# Patient Record
Sex: MALE | Race: WHITE | Employment: OTHER | URBAN - NONMETROPOLITAN AREA
[De-identification: names, ages, dates, MRNs, and addresses within clinical notes are randomized per-mention and may not be internally consistent; named-entity substitution may affect disease eponyms.]

---

## 2018-05-25 ENCOUNTER — TELEPHONE (OUTPATIENT)
Dept: NEUROSURGERY | Age: 38
End: 2018-05-25

## 2018-05-29 ENCOUNTER — TELEPHONE (OUTPATIENT)
Dept: NEUROSURGERY | Age: 38
End: 2018-05-29

## 2018-06-07 ENCOUNTER — OFFICE VISIT (OUTPATIENT)
Dept: NEUROSURGERY | Age: 38
End: 2018-06-07
Payer: COMMERCIAL

## 2018-06-07 VITALS
OXYGEN SATURATION: 98 % | DIASTOLIC BLOOD PRESSURE: 96 MMHG | BODY MASS INDEX: 24 KG/M2 | SYSTOLIC BLOOD PRESSURE: 130 MMHG | HEIGHT: 74 IN | WEIGHT: 187 LBS | HEART RATE: 79 BPM

## 2018-06-07 DIAGNOSIS — G89.29 CHRONIC MIDLINE LOW BACK PAIN WITH BILATERAL SCIATICA: Primary | ICD-10-CM

## 2018-06-07 DIAGNOSIS — R20.0 NUMBNESS OF LEFT FOOT: ICD-10-CM

## 2018-06-07 DIAGNOSIS — M51.36 DDD (DEGENERATIVE DISC DISEASE), LUMBAR: ICD-10-CM

## 2018-06-07 DIAGNOSIS — M51.26 LUMBAR DISC HERNIATION: ICD-10-CM

## 2018-06-07 DIAGNOSIS — M54.41 CHRONIC MIDLINE LOW BACK PAIN WITH BILATERAL SCIATICA: Primary | ICD-10-CM

## 2018-06-07 DIAGNOSIS — M54.42 CHRONIC MIDLINE LOW BACK PAIN WITH BILATERAL SCIATICA: Primary | ICD-10-CM

## 2018-06-07 PROCEDURE — 99204 OFFICE O/P NEW MOD 45 MIN: CPT | Performed by: NURSE PRACTITIONER

## 2018-06-07 RX ORDER — GABAPENTIN 300 MG/1
CAPSULE ORAL
COMMUNITY
Start: 2018-06-05 | End: 2018-09-26 | Stop reason: ALTCHOICE

## 2018-06-07 RX ORDER — IBUPROFEN 800 MG/1
800 TABLET ORAL EVERY 8 HOURS PRN
COMMUNITY
Start: 2018-06-05

## 2018-06-07 RX ORDER — CYCLOBENZAPRINE HCL 10 MG
10 TABLET ORAL 3 TIMES DAILY PRN
COMMUNITY
Start: 2018-06-05 | End: 2018-09-26 | Stop reason: ALTCHOICE

## 2018-06-07 ASSESSMENT — ENCOUNTER SYMPTOMS
HEARTBURN: 0
BLOOD IN STOOL: 0
DIARRHEA: 0
EYES NEGATIVE: 1
BACK PAIN: 1
CONSTIPATION: 1
RESPIRATORY NEGATIVE: 1
ABDOMINAL PAIN: 1
ORTHOPNEA: 0
NAUSEA: 1
VOMITING: 1

## 2018-09-26 ENCOUNTER — HOSPITAL ENCOUNTER (OUTPATIENT)
Dept: PAIN MANAGEMENT | Age: 38
Discharge: HOME | End: 2018-09-26
Payer: COMMERCIAL

## 2018-09-26 VITALS
TEMPERATURE: 97.8 F | DIASTOLIC BLOOD PRESSURE: 111 MMHG | SYSTOLIC BLOOD PRESSURE: 160 MMHG | BODY MASS INDEX: 25.54 KG/M2 | HEART RATE: 90 BPM | OXYGEN SATURATION: 100 % | RESPIRATION RATE: 18 BRPM | WEIGHT: 199 LBS | HEIGHT: 74 IN

## 2018-09-26 DIAGNOSIS — G89.29 CHRONIC BILATERAL LOW BACK PAIN, WITH SCIATICA PRESENCE UNSPECIFIED: Primary | ICD-10-CM

## 2018-09-26 DIAGNOSIS — M53.3 SACROILIAC JOINT DYSFUNCTION OF BOTH SIDES: ICD-10-CM

## 2018-09-26 DIAGNOSIS — M79.18 MYOFASCIAL PAIN: ICD-10-CM

## 2018-09-26 DIAGNOSIS — M62.830 MUSCLE SPASM OF BACK: Primary | ICD-10-CM

## 2018-09-26 DIAGNOSIS — M54.5 CHRONIC BILATERAL LOW BACK PAIN, WITH SCIATICA PRESENCE UNSPECIFIED: Primary | ICD-10-CM

## 2018-09-26 PROCEDURE — 99204 OFFICE O/P NEW MOD 45 MIN: CPT

## 2018-09-26 PROCEDURE — 99204 OFFICE O/P NEW MOD 45 MIN: CPT | Performed by: NURSE PRACTITIONER

## 2018-09-26 RX ORDER — EMOLLIENT BASE
CREAM (GRAM) TOPICAL
Qty: 300 G | Refills: 5 | Status: SHIPPED | OUTPATIENT
Start: 2018-09-26

## 2018-09-26 RX ORDER — TIZANIDINE 4 MG/1
TABLET ORAL
Qty: 60 TABLET | Refills: 0 | Status: SHIPPED | OUTPATIENT
Start: 2018-09-26

## 2018-09-26 ASSESSMENT — PAIN DESCRIPTION - PAIN TYPE: TYPE: CHRONIC PAIN

## 2018-09-26 ASSESSMENT — PAIN DESCRIPTION - LOCATION: LOCATION: BACK

## 2018-09-26 ASSESSMENT — PAIN DESCRIPTION - FREQUENCY: FREQUENCY: CONTINUOUS

## 2018-09-26 ASSESSMENT — PAIN DESCRIPTION - PROGRESSION: CLINICAL_PROGRESSION: GRADUALLY WORSENING

## 2018-09-26 ASSESSMENT — PAIN SCALES - GENERAL: PAINLEVEL_OUTOF10: 10

## 2018-09-26 ASSESSMENT — PAIN DESCRIPTION - ORIENTATION: ORIENTATION: LOWER

## 2018-09-26 ASSESSMENT — ACTIVITIES OF DAILY LIVING (ADL): EFFECT OF PAIN ON DAILY ACTIVITIES: LIMITS DAILY ACTIVITIES

## 2018-09-26 ASSESSMENT — PAIN DESCRIPTION - ONSET: ONSET: ON-GOING
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
## 2018-10-24 ENCOUNTER — TELEPHONE (OUTPATIENT)
Dept: NEUROSURGERY | Age: 38
End: 2018-10-24

## 2018-10-24 ENCOUNTER — TELEPHONE (OUTPATIENT)
Dept: PAIN MANAGEMENT | Age: 38
End: 2018-10-24

## 2018-10-24 NOTE — TELEPHONE ENCOUNTER
Patient called to report that he went to the ER at [facility] yesterday 10/23/2018 for low back pain. Patient stated that he was given a steroid shot, a shot of Diluadid, a medrol dose pack, and an Rx for Norco 7.5mg/325mg #15. Patient stated that he had already filled Norco Rx. Patient also stated that he was prescribed Lyrica per Dr. Vinny Garcia office. Patient is scheduled for injections on 10/25/2018, patient wanted to know if he could still have injections. Discussed all of the above with Sherry Aquino, per [facility], patient informed not to fill any other narcotic Rx's without approval first or he would be dismissed. Patient also informed that his injection appointment would have to be rescheduled due to him currently taking steroids. Appointment rescheduled in 1 month per Germán Son APRN. Patient also stated that he was given Lyrica from Dr. Klaus Holbrook for Low back pain as well. Patient has been seeing multiple providers for low back pain, patient informed that per Germán Son APRN, this can make treatment difficult and patient only needs to see 1 provider for current complaint of chronic low back pain. Patient verbalized understanding of all instructions given.

## 2018-11-20 ENCOUNTER — HOSPITAL ENCOUNTER (OUTPATIENT)
Dept: PAIN MANAGEMENT | Age: 38
Discharge: HOME | End: 2018-11-20
Payer: COMMERCIAL

## 2018-11-20 VITALS
WEIGHT: 201.38 LBS | HEIGHT: 74 IN | OXYGEN SATURATION: 100 % | TEMPERATURE: 97.4 F | DIASTOLIC BLOOD PRESSURE: 109 MMHG | HEART RATE: 109 BPM | RESPIRATION RATE: 20 BRPM | BODY MASS INDEX: 25.84 KG/M2 | SYSTOLIC BLOOD PRESSURE: 156 MMHG

## 2018-11-20 LAB — SUMMARY COMPLIANCE DRUG ANAL, UR: NORMAL ML

## 2018-11-20 PROCEDURE — 80307 DRUG TEST PRSMV CHEM ANLYZR: CPT

## 2018-11-20 ASSESSMENT — PAIN DESCRIPTION - DESCRIPTORS: DESCRIPTORS: CONSTANT;SHARP

## 2018-11-20 ASSESSMENT — PAIN DESCRIPTION - DIRECTION: RADIATING_TOWARDS: DOWN BILATERAL LEGS

## 2018-11-20 ASSESSMENT — ACTIVITIES OF DAILY LIVING (ADL): EFFECT OF PAIN ON DAILY ACTIVITIES: LIMITS ACTIVIITES

## 2018-11-20 ASSESSMENT — PAIN DESCRIPTION - LOCATION: LOCATION: BACK

## 2018-11-20 ASSESSMENT — PAIN SCALES - GENERAL: PAINLEVEL_OUTOF10: 8

## 2018-11-20 ASSESSMENT — PAIN DESCRIPTION - ORIENTATION: ORIENTATION: LOWER

## 2018-11-20 ASSESSMENT — PAIN DESCRIPTION - ONSET: ONSET: ON-GOING

## 2018-11-20 ASSESSMENT — PAIN DESCRIPTION - PAIN TYPE: TYPE: CHRONIC PAIN

## 2018-11-20 ASSESSMENT — PAIN DESCRIPTION - FREQUENCY: FREQUENCY: CONTINUOUS

## 2018-11-20 ASSESSMENT — PAIN DESCRIPTION - PROGRESSION: CLINICAL_PROGRESSION: NOT CHANGED

## 2018-11-27 ENCOUNTER — TELEPHONE (OUTPATIENT)
Dept: PAIN MANAGEMENT | Age: 38
End: 2018-11-27

## 2018-11-27 NOTE — TELEPHONE ENCOUNTER
Per Timothy Back APRN, pt to be discharged due to recent UDS results. . Attempted to notify pt of this. No answer. Message left for pt to call office. [facility] office notified to send pt a discharge letter.

## 2018-11-28 ENCOUNTER — TELEPHONE (OUTPATIENT)
Dept: PAIN MANAGEMENT | Age: 38
End: 2018-11-28

## 2019-01-22 NOTE — PROGRESS NOTES
"Primary Care Provider: Michelle Lockhart  Requesting Provider: Patria Alcocer*    Chief Complaint:   Chief Complaint   Patient presents with   • Neck Pain   • Back Pain   • Leg Pain     BLE     History of Present Illness  Ezra King is a 38 y.o. male is being seen for consultation today at the request of Patria Kulkarni*    He presents today with his wife for a complaint of neck and lower back pain that has progressively worsened over the last 1 year.    Neck discomfort is intermittent, approximately 3 times daily.  He describes his discomfort as \"cramps and a grinding pain\".  Neck discomfort radiates to the posterior right eye, causing eye twitching.  He additionally reports numbness and tingling to the tips of the first, second, and fifth digits bilaterally, bilateral upper extremity weakness, and a burning dysesthesia to the right upper neck that is non radiating.  Fine motor skills have progressively worsened.  His neck discomfort increases with driving, and lessens with use of Lyrica, Motrin, Flexeril, Norco, and injections which he receives from pain management.  Mr. King participated in the initial eval for physical therapy but did not return for additional sessions.    He describes his lumbar back pain is a constant throb.  He reports numbness and tingling that radiates down the posterior aspect of his bilateral lower extremities and extends into the bilateral feet.  He denies burning dysesthesias.  His discomfort increases with prolonged standing and sitting.  Despite use of his pain medications he states his back discomfort is never relieved and he is not able to participate in any physical activity due to his discomfort.  He denies bowel dysfunction.  He reports one episode of enuresis after starting gabapentin, medication since discontinued.  No additional episodes of enuresis.  He denies fevers, chills, night sweats, unexplained weight loss, or saddle anesthesia.  He currently rates the " "severity of his neck and back pain 8/10.    Review of Systems   Constitutional: Positive for activity change, appetite change, chills, fatigue, fever and unexpected weight change.   HENT: Positive for congestion, drooling, sinus pressure, sneezing and trouble swallowing.    Eyes: Positive for pain, redness and itching.   Respiratory: Positive for cough, chest tightness, shortness of breath and wheezing.    Cardiovascular: Positive for chest pain and leg swelling.   Gastrointestinal: Positive for abdominal pain, constipation, nausea and rectal pain.   Endocrine: Negative.    Genitourinary: Positive for decreased urine volume, difficulty urinating and urgency.   Musculoskeletal: Positive for back pain, neck pain and neck stiffness.   Skin: Negative.    Allergic/Immunologic: Positive for environmental allergies.   Neurological: Positive for dizziness, tremors, weakness, light-headedness, numbness and headaches.   Hematological: Bruises/bleeds easily.   Psychiatric/Behavioral: Positive for behavioral problems, confusion and sleep disturbance. The patient is nervous/anxious and is hyperactive.    All other systems reviewed and are negative.    No past medical history on file.    No past surgical history on file.    Family History: family history is not on file.    Social History:      Medications:  No current outpatient medications on file.    Allergies:  Patient has no allergy information on record.    Objective    Ht 157.5 cm (62\")   Wt 93.4 kg (206 lb)   BMI 37.68 kg/m²    Physical Exam   Constitutional: He is oriented to person, place, and time. He appears well-developed and well-nourished.  Non-toxic appearance. He does not have a sickly appearance. He does not appear ill. No distress.   Eyes: EOM are normal. Pupils are equal, round, and reactive to light.   Cardiovascular:   Pulses:       Popliteal pulses are 2+ on the right side.        Dorsalis pedis pulses are 2+ on the right side.   Diffuse varicosities noted " to the right lower extremity.  Compression stocking noted.   Neurological: He is oriented to person, place, and time. Gait normal.   Reflex Scores:       Tricep reflexes are 1+ on the right side and 1+ on the left side.       Bicep reflexes are 1+ on the right side and 1+ on the left side.       Brachioradialis reflexes are 1+ on the right side and 1+ on the left side.       Patellar reflexes are 1+ on the right side and 1+ on the left side.       Achilles reflexes are 1+ on the right side and 1+ on the left side.  Psychiatric: His speech is normal.   Nursing note and vitals reviewed.    Neurologic Exam     Mental Status   Oriented to person, place, and time.   Attention: normal. Concentration: normal.   Speech: speech is normal   Level of consciousness: alert    Cranial Nerves     CN II   Visual fields full to confrontation.     CN III, IV, VI   Pupils are equal, round, and reactive to light.  Extraocular motions are normal.     CN V   Facial sensation intact.     CN VII   Facial expression full, symmetric.     CN VIII   CN VIII normal.     CN IX, X   CN IX normal.     CN XI   CN XI normal.     Motor Exam   Muscle bulk: normal  Overall muscle tone: normal  Right arm tone: normal  Left arm tone: normal  Right arm pronator drift: absent  Left arm pronator drift: absent  Right leg tone: normal  Left leg tone: normal    Strength   Right deltoid: 5/5  Left deltoid: 5/5  Right biceps: 5/5  Left biceps: 5/5  Right triceps: 5/5  Left triceps: 5/5  Right wrist extension: 5/5  Left wrist extension: 5/5  Right iliopsoas: 5/5  Left iliopsoas: 5/5  Right quadriceps: 5/5  Left quadriceps: 5/5  Right anterior tibial: 5/5  Left anterior tibial: 5/5  Right posterior tibial: 5/5  Left posterior tibial: 5/5  Give way weakness throughout.     Sensory Exam   Light touch normal.     Gait, Coordination, and Reflexes     Gait  Gait: normal    Tremor   Resting tremor: absent  Intention tremor: absent  Action tremor: absent    Reflexes    Right brachioradialis: 1+  Left brachioradialis: 1+  Right biceps: 1+  Left biceps: 1+  Right triceps: 1+  Left triceps: 1+  Right patellar: 1+  Left patellar: 1+  Right achilles: 1+  Left achilles: 1+  Right : 1+  Left : 2+  Right plantar: normal  Left plantar: normal  Right Fowler: absent  Left Fowler: absent  Right ankle clonus: absent  Left ankle clonus: absent  Right pendular knee jerk: absent  Left pendular knee jerk: absent    Oswestry Disability Index (Magdalena et al, 1980)   Score   Pain Intensity 3   Personal Care 4   Lifting 4   Walking 3   Sitting 3   Standing 3   Sleeping 4   Sex Life (if applicable) 4   Social Life 4   Traveling 3   Previous Treatment Yes   TOTAL = Score x2  (or 2.22 if one NA) 70     SCORE INTERPRETATION OF THE OSWESTRY LBP DISABILITY QUESTIONNAIRE     60-80% Crippled Back pain impinges on all aspects of these patients’ lives both at home and at work. Positive intervention is required.     Imaging: (independent review and interpretation)                     ASSESSMENT and PLAN  Ezra King is a 38 y.o. male with a significant comorbidity retention, arthritis, tobacco abuse, and obesity. He presents with a new problem of neck and back pain. Physical exam findings of diffuse varicosities in the right lower extremity, decreased  strength on the right, in general decreased reflexes.  His imaging shows left foraminal narrowing at C3/4; right disc bulge at L3/ 4, moderate disc space narrowing at L4/5, facet hypertrophy with mild bilateral foraminal narrowing; facet hypertrophy with mild bilateral foraminal narrowing of L5-S1.    I favor continuing conservative management at this time .  I encouraged the patient to reconsider physical therapy as a treatment option.   PT/OT, prescription provided to patient.  Massage and Chiropractic as tolerated.  May continue currently prescribed medications as recommended by pain management.  B/R/AE and use discussed.  In the interim, due to  upper and lower extremity paresthesias I would like to obtain an EMG/nerve conduction study.  If no improvement return after 4 week for PT/OT for reassessment.   I advised the patient to call as needed for complaints of weakness, paresthesias, gait disturbances, or any additional concerns.  Mr. King agrees with this plan of care.    The patient understands the many dangers of continuing to use tobacco. Despite this, Mr. King states quitting is not an immediate priority at this time and declines to discuss tobacco cessation.  I reminded the patient that if quitting becomes an increased priority to contact us for help with quitting.        BMI today is 37.7.  Information on the DASH diet provided in the AVS.  We will continue to provided diet and exercise information with the goal of weight loss at each scheduled appointment.     Ezra was seen today for neck pain, back pain and leg pain.    Diagnoses and all orders for this visit:    Neck pain  -     Ambulatory Referral to Physical Therapy Evaluate and treat (3x a week for 3-4 weeks)    Numbness and tingling of both upper extremities  -     EMG & Nerve Conduction Test; Future    Chronic bilateral low back pain with bilateral sciatica    Numbness and tingling of both lower extremities  -     EMG & Nerve Conduction Test; Future    Obesity (BMI 30-39.9)    Cigarette smoker      Return in about 4 weeks (around 2/20/2019) for Next scheduled follow up with Dr Capone.    Thank you for this Consultation and the opportunity to participate in zEra's care.    Sincerely,  OLIVER Bradley    Level of Risk: Moderate due to: acute illness with sytemic symptoms  MDM: Moderate Complexity  (Mod = 67959, High = 19408)

## 2019-01-23 ENCOUNTER — OFFICE VISIT (OUTPATIENT)
Dept: NEUROSURGERY | Facility: CLINIC | Age: 39
End: 2019-01-23

## 2019-01-23 VITALS — BODY MASS INDEX: 37.91 KG/M2 | HEIGHT: 62 IN | WEIGHT: 206 LBS

## 2019-01-23 DIAGNOSIS — F17.210 CIGARETTE SMOKER: ICD-10-CM

## 2019-01-23 DIAGNOSIS — G89.29 CHRONIC BILATERAL LOW BACK PAIN WITH BILATERAL SCIATICA: ICD-10-CM

## 2019-01-23 DIAGNOSIS — R20.2 NUMBNESS AND TINGLING OF BOTH UPPER EXTREMITIES: ICD-10-CM

## 2019-01-23 DIAGNOSIS — M54.41 CHRONIC BILATERAL LOW BACK PAIN WITH BILATERAL SCIATICA: ICD-10-CM

## 2019-01-23 DIAGNOSIS — R20.2 NUMBNESS AND TINGLING OF BOTH LOWER EXTREMITIES: ICD-10-CM

## 2019-01-23 DIAGNOSIS — E66.9 OBESITY (BMI 30-39.9): ICD-10-CM

## 2019-01-23 DIAGNOSIS — R20.0 NUMBNESS AND TINGLING OF BOTH LOWER EXTREMITIES: ICD-10-CM

## 2019-01-23 DIAGNOSIS — R20.0 NUMBNESS AND TINGLING OF BOTH UPPER EXTREMITIES: ICD-10-CM

## 2019-01-23 DIAGNOSIS — M54.2 NECK PAIN: Primary | ICD-10-CM

## 2019-01-23 DIAGNOSIS — M54.42 CHRONIC BILATERAL LOW BACK PAIN WITH BILATERAL SCIATICA: ICD-10-CM

## 2019-01-23 PROCEDURE — 99204 OFFICE O/P NEW MOD 45 MIN: CPT | Performed by: NURSE PRACTITIONER

## 2019-01-23 RX ORDER — GUAIFENESIN 600 MG/1
TABLET, EXTENDED RELEASE ORAL
Status: ON HOLD | COMMUNITY
End: 2019-11-25

## 2019-01-23 RX ORDER — LORATADINE 10 MG/1
TABLET, ORALLY DISINTEGRATING ORAL
COMMUNITY
End: 2019-04-25

## 2019-01-23 NOTE — PATIENT INSTRUCTIONS
Health Risks of Smoking  Smoking cigarettes is very bad for your health. Tobacco smoke has over 200 known poisons in it. It contains the poisonous gases nitrogen oxide and carbon monoxide. There are over 60 chemicals in tobacco smoke that cause cancer.  Smoking is difficult to quit because a chemical in tobacco, called nicotine, causes addiction or dependence. When you smoke and inhale, nicotine is absorbed rapidly into the bloodstream through your lungs. Both inhaled and non-inhaled nicotine may be addictive.  What are the risks of cigarette smoke?  Cigarette smokers have an increased risk of many serious medical problems, including:  · Lung cancer.  · Lung disease, such as pneumonia, bronchitis, and emphysema.  · Chest pain (angina) and heart attack because the heart is not getting enough oxygen.  · Heart disease and peripheral blood vessel disease.  · High blood pressure (hypertension).  · Stroke.  · Oral cancer, including cancer of the lip, mouth, or voice box.  · Bladder cancer.  · Pancreatic cancer.  · Cervical cancer.  · Pregnancy complications, including premature birth.  · Stillbirths and smaller  babies, birth defects, and genetic damage to sperm.  · Early menopause.  · Lower estrogen level for women.  · Infertility.  · Facial wrinkles.  · Blindness.  · Increased risk of broken bones (fractures).  · Senile dementia.  · Stomach ulcers and internal bleeding.  · Delayed wound healing and increased risk of complications during surgery.  · Even smoking lightly shortens your life expectancy by several years.    Because of secondhand smoke exposure, children of smokers have an increased risk of the following:  · Sudden infant death syndrome (SIDS).  · Respiratory infections.  · Lung cancer.  · Heart disease.  · Ear infections.    What are the benefits of quitting?  There are many health benefits of quitting smoking. Here are some of them:  · Within days of quitting smoking, your risk of having a heart  attack decreases, your blood flow improves, and your lung capacity improves. Blood pressure, pulse rate, and breathing patterns start returning to normal soon after quitting.  · Within months, your lungs may clear up completely.  · Quitting for 10 years reduces your risk of developing lung cancer and heart disease to almost that of a nonsmoker.  · People who quit may see an improvement in their overall quality of life.    How do I quit smoking?  Smoking is an addiction with both physical and psychological effects, and longtime habits can be hard to change. Your health care provider can recommend:  · Programs and community resources, which may include group support, education, or talk therapy.  · Prescription medicines to help reduce cravings.  · Nicotine replacement products, such as patches, gum, and nasal sprays. Use these products only as directed. Do not replace cigarette smoking with electronic cigarettes, which are commonly called e-cigarettes. The safety of e-cigarettes is not known, and some may contain harmful chemicals.  · A combination of two or more of these methods.    Where to find more information:  · American Lung Association: www.lung.org  · American Cancer Society: www.cancer.org  Summary  · Smoking cigarettes is very bad for your health. Cigarette smokers have an increased risk of many serious medical problems, including several cancers, heart disease, and stroke.  · Smoking is an addiction with both physical and psychological effects, and longtime habits can be hard to change.  · By stopping right away, you can greatly reduce the risk of medical problems for you and your family.  · To help you quit smoking, your health care provider can recommend programs, community resources, prescription medicines, and nicotine replacement products such as patches, gum, and nasal sprays.  This information is not intended to replace advice given to you by your health care provider. Make sure you discuss any  "questions you have with your health care provider.  Document Released: 01/25/2006 Document Revised: 12/22/2017 Document Reviewed: 12/22/2017  [employer] Interactive Patient Education © 2017 [employer]  DASH Eating Plan  DASH stands for \"Dietary Approaches to Stop Hypertension.\" The DASH eating plan is a healthy eating plan that has been shown to reduce high blood pressure (hypertension). It may also reduce your risk for type 2 diabetes, heart disease, and stroke. The DASH eating plan may also help with weight loss.  What are tips for following this plan?  General guidelines  · Avoid eating more than 2,300 mg (milligrams) of salt (sodium) a day. If you have hypertension, you may need to reduce your sodium intake to 1,500 mg a day.  · Limit alcohol intake to no more than 1 drink a day for nonpregnant women and 2 drinks a day for men. One drink equals 12 oz of beer, 5 oz of wine, or 1½ oz of hard liquor.  · Work with your health care provider to maintain a healthy body weight or to lose weight. Ask what an ideal weight is for you.  · Get at least 30 minutes of exercise that causes your heart to beat faster (aerobic exercise) most days of the week. Activities may include walking, swimming, or biking.  · Work with your health care provider or diet and nutrition specialist (dietitian) to adjust your eating plan to your individual calorie needs.  Reading food labels  · Check food labels for the amount of sodium per serving. Choose foods with less than 5 percent of the Daily Value of sodium. Generally, foods with less than 300 mg of sodium per serving fit into this eating plan.  · To find whole grains, look for the word \"whole\" as the first word in the ingredient list.  Shopping  · Buy products labeled as \"low-sodium\" or \"no salt added.\"  · Buy fresh foods. Avoid canned foods and premade or frozen meals.  Cooking  · Avoid adding salt when cooking. Use salt-free seasonings or herbs instead of table salt or sea salt. Check " with your health care provider or pharmacist before using salt substitutes.  · Do not aguiar foods. Cook foods using healthy methods such as baking, boiling, grilling, and broiling instead.  · Cook with heart-healthy oils, such as olive, canola, soybean, or sunflower oil.  Meal planning    · Eat a balanced diet that includes:  ? 5 or more servings of fruits and vegetables each day. At each meal, try to fill half of your plate with fruits and vegetables.  ? Up to 6-8 servings of whole grains each day.  ? Less than 6 oz of lean meat, poultry, or fish each day. A 3-oz serving of meat is about the same size as a deck of cards. One egg equals 1 oz.  ? 2 servings of low-fat dairy each day.  ? A serving of nuts, seeds, or beans 5 times each week.  ? Heart-healthy fats. Healthy fats called Omega-3 fatty acids are found in foods such as flaxseeds and coldwater fish, like sardines, salmon, and mackerel.  · Limit how much you eat of the following:  ? Canned or prepackaged foods.  ? Food that is high in trans fat, such as fried foods.  ? Food that is high in saturated fat, such as fatty meat.  ? Sweets, desserts, sugary drinks, and other foods with added sugar.  ? Full-fat dairy products.  · Do not salt foods before eating.  · Try to eat at least 2 vegetarian meals each week.  · Eat more home-cooked food and less restaurant, buffet, and fast food.  · When eating at a restaurant, ask that your food be prepared with less salt or no salt, if possible.  What foods are recommended?  The items listed may not be a complete list. Talk with your dietitian about what dietary choices are best for you.  Grains  Whole-grain or whole-wheat bread. Whole-grain or whole-wheat pasta. Brown rice. Oatmeal. Quinoa. Bulgur. Whole-grain and low-sodium cereals. Madyson bread. Low-fat, low-sodium crackers. Whole-wheat flour tortillas.  Vegetables  Fresh or frozen vegetables (raw, steamed, roasted, or grilled). Low-sodium or reduced-sodium tomato and  vegetable juice. Low-sodium or reduced-sodium tomato sauce and tomato paste. Low-sodium or reduced-sodium canned vegetables.  Fruits  All fresh, dried, or frozen fruit. Canned fruit in natural juice (without added sugar).  Meat and other protein foods  Skinless chicken or turkey. Ground chicken or turkey. Pork with fat trimmed off. Fish and seafood. Egg whites. Dried beans, peas, or lentils. Unsalted nuts, nut butters, and seeds. Unsalted canned beans. Lean cuts of beef with fat trimmed off. Low-sodium, lean deli meat.  Dairy  Low-fat (1%) or fat-free (skim) milk. Fat-free, low-fat, or reduced-fat cheeses. Nonfat, low-sodium ricotta or cottage cheese. Low-fat or nonfat yogurt. Low-fat, low-sodium cheese.  Fats and oils  Soft margarine without trans fats. Vegetable oil. Low-fat, reduced-fat, or light mayonnaise and salad dressings (reduced-sodium). Canola, safflower, olive, soybean, and sunflower oils. Avocado.  Seasoning and other foods  Herbs. Spices. Seasoning mixes without salt. Unsalted popcorn and pretzels. Fat-free sweets.  What foods are not recommended?  The items listed may not be a complete list. Talk with your dietitian about what dietary choices are best for you.  Grains  Baked goods made with fat, such as croissants, muffins, or some breads. Dry pasta or rice meal packs.  Vegetables  Creamed or fried vegetables. Vegetables in a cheese sauce. Regular canned vegetables (not low-sodium or reduced-sodium). Regular canned tomato sauce and paste (not low-sodium or reduced-sodium). Regular tomato and vegetable juice (not low-sodium or reduced-sodium). Pickles. Olives.  Fruits  Canned fruit in a light or heavy syrup. Fried fruit. Fruit in cream or butter sauce.  Meat and other protein foods  Fatty cuts of meat. Ribs. Fried meat. Mead. Sausage. Bologna and other processed lunch meats. Salami. Fatback. Hotdogs. Bratwurst. Salted nuts and seeds. Canned beans with added salt. Canned or smoked fish. Whole eggs or  egg yolks. Chicken or turkey with skin.  Dairy  Whole or 2% milk, cream, and half-and-half. Whole or full-fat cream cheese. Whole-fat or sweetened yogurt. Full-fat cheese. Nondairy creamers. Whipped toppings. Processed cheese and cheese spreads.  Fats and oils  Butter. Stick margarine. Lard. Shortening. Ghee. Mead fat. Tropical oils, such as coconut, palm kernel, or palm oil.  Seasoning and other foods  Salted popcorn and pretzels. Onion salt, garlic salt, seasoned salt, table salt, and sea salt. Worcestershire sauce. Tartar sauce. Barbecue sauce. Teriyaki sauce. Soy sauce, including reduced-sodium. Steak sauce. Canned and packaged gravies. Fish sauce. Oyster sauce. Cocktail sauce. Horseradish that you find on the shelf. Ketchup. Mustard. Meat flavorings and tenderizers. Bouillon cubes. Hot sauce and Tabasco sauce. Premade or packaged marinades. Premade or packaged taco seasonings. Relishes. Regular salad dressings.  Where to find more information:  · National Heart, Lung, and Blood [place]: www.nhlbi.nih.gov  · American Heart Association: www.heart.org  Summary  · The DASH eating plan is a healthy eating plan that has been shown to reduce high blood pressure (hypertension). It may also reduce your risk for type 2 diabetes, heart disease, and stroke.  · With the DASH eating plan, you should limit salt (sodium) intake to 2,300 mg a day. If you have hypertension, you may need to reduce your sodium intake to 1,500 mg a day.  · When on the DASH eating plan, aim to eat more fresh fruits and vegetables, whole grains, lean proteins, low-fat dairy, and heart-healthy fats.  · Work with your health care provider or diet and nutrition specialist (dietitian) to adjust your eating plan to your individual calorie needs.  This information is not intended to replace advice given to you by your health care provider. Make sure you discuss any questions you have with your health care provider.  Document Released: 12/06/2012  Document Revised: 12/11/2017 Document Reviewed: 12/11/2017  [employer] Interactive Patient Education © 2018 [employer]

## 2019-03-12 ENCOUNTER — TELEPHONE (OUTPATIENT)
Dept: NEUROSURGERY | Facility: CLINIC | Age: 39
End: 2019-03-12

## 2019-03-12 NOTE — TELEPHONE ENCOUNTER
"Wife calls back, very angry. Stating that we was denying him medical care because they could not afford the testing that we ordered. Apparently, his EMG was going to cost $50+ prior to testing and PT visits were going to cost $75/session. Wife is upset that I have canceled his appointment, however, I have explained to her that there is no need to see us because we have no test results. She becomes angry stating she \"would find another doctor that wouldn't deny medical care for a single family that couldn't afford proper testing\". This has been forwarded to Olvin Pyle and Nicolasa Suresh  "

## 2019-03-12 NOTE — TELEPHONE ENCOUNTER
I have contacted [facility] who advised me the patient was a no-show for his scheduled EMG testing. Also, we have not received any notes from physical therapy. I have tried to contact the patient and the spouse, leaving messages on both numbers. We do not need to see the patient until this testing has been completed. I have canceled the appointment for 03/13/19, I have left this information on voicemail.

## 2019-04-24 ENCOUNTER — TELEPHONE (OUTPATIENT)
Dept: VASCULAR SURGERY | Facility: CLINIC | Age: 39
End: 2019-04-24

## 2019-04-24 NOTE — TELEPHONE ENCOUNTER
Called to remind Mr King of his appointment for Thursday, April 25th, 2019 at 1130 am with Dr Varner.     When calling it goes straight to busy signal.

## 2019-04-25 ENCOUNTER — TELEPHONE (OUTPATIENT)
Dept: VASCULAR SURGERY | Facility: CLINIC | Age: 39
End: 2019-04-25

## 2019-04-25 ENCOUNTER — OFFICE VISIT (OUTPATIENT)
Dept: VASCULAR SURGERY | Facility: CLINIC | Age: 39
End: 2019-04-25

## 2019-04-25 VITALS
OXYGEN SATURATION: 99 % | WEIGHT: 212 LBS | HEIGHT: 74 IN | DIASTOLIC BLOOD PRESSURE: 88 MMHG | HEART RATE: 69 BPM | SYSTOLIC BLOOD PRESSURE: 128 MMHG | BODY MASS INDEX: 27.21 KG/M2

## 2019-04-25 DIAGNOSIS — I83.12 VARICOSE VEINS OF BOTH LOWER EXTREMITIES WITH INFLAMMATION: ICD-10-CM

## 2019-04-25 DIAGNOSIS — I83.11 VARICOSE VEINS OF BOTH LOWER EXTREMITIES WITH INFLAMMATION: ICD-10-CM

## 2019-04-25 DIAGNOSIS — I87.323 CHRONIC VENOUS HYPERTENSION WITH INFLAMMATION INVOLVING BOTH SIDES: Primary | ICD-10-CM

## 2019-04-25 DIAGNOSIS — F17.210 CIGARETTE SMOKER: ICD-10-CM

## 2019-04-25 DIAGNOSIS — M79.89 LEG SWELLING: ICD-10-CM

## 2019-04-25 PROCEDURE — 99204 OFFICE O/P NEW MOD 45 MIN: CPT | Performed by: SURGERY

## 2019-04-25 RX ORDER — METOPROLOL SUCCINATE 50 MG/1
TABLET, EXTENDED RELEASE ORAL
Status: ON HOLD | COMMUNITY
Start: 2019-03-02 | End: 2019-11-25

## 2019-04-25 RX ORDER — DICYCLOMINE HYDROCHLORIDE 10 MG/1
CAPSULE ORAL EVERY 8 HOURS SCHEDULED
Status: ON HOLD | COMMUNITY
End: 2019-11-25

## 2019-04-25 RX ORDER — LEVOTHYROXINE SODIUM 0.12 MG/1
TABLET ORAL
Status: ON HOLD | COMMUNITY
Start: 2019-03-04 | End: 2019-11-25

## 2019-04-25 RX ORDER — HYDROCODONE BITARTRATE AND ACETAMINOPHEN 7.5; 325 MG/1; MG/1
TABLET ORAL EVERY 8 HOURS SCHEDULED
Status: ON HOLD | COMMUNITY
End: 2019-11-25

## 2019-04-25 RX ORDER — PANTOPRAZOLE SODIUM 40 MG/1
TABLET, DELAYED RELEASE ORAL
Refills: 12 | COMMUNITY
Start: 2019-04-18

## 2019-04-25 RX ORDER — PREGABALIN 75 MG/1
CAPSULE ORAL EVERY 12 HOURS SCHEDULED
Status: ON HOLD | COMMUNITY
End: 2019-11-25

## 2019-04-25 RX ORDER — IBUPROFEN 800 MG/1
TABLET ORAL
COMMUNITY
Start: 2018-06-05 | End: 2020-09-21

## 2019-04-25 RX ORDER — CYCLOBENZAPRINE HCL 10 MG
TABLET ORAL
Refills: 5 | COMMUNITY
Start: 2019-04-13 | End: 2020-09-21

## 2019-04-25 NOTE — PROGRESS NOTES
04/25/2019      Rosette Estrada APRN  [address] [place] [address]    Ezra King  1980    Chief Complaint   Patient presents with   • Establish Care     Varicos veins bilaterally from the hip down. Pt states that he is unable to stand for long periods of time because of the pain.  Referred by OLIVER Shen       Dear OLIVER Guy:      HPI  I had the pleasure of seeing your patient Ezra King in the office today.  Thank you kindly for this consultation.  As you recall, Ezra King is a 38 y.o.  male who you are currently following for routine health maintenance.  He states he has had significant varicosities in both lower extremities for the past year and a half.  He even started getting varicosities in his early teenage years.  He denies any history of DVT.  He wears thigh high compressions stockings.    He reports ruptured varicosities in the past couple months.   He has seen Dr. Odonnell in [place].      Past Medical History:   Diagnosis Date   • Arthritis    • Hypertension    • Hypothyroid    • Irritable bowel        Past Surgical History:   Procedure Laterality Date   • KNEE SURGERY Left     x2   • KNEE SURGERY Right     x2   • MOUTH SURGERY     • VASECTOMY         History reviewed. No pertinent family history.    Social History     Socioeconomic History   • Marital status:      Spouse name: Not on file   • Number of children: Not on file   • Years of education: Not on file   • Highest education level: Not on file   Tobacco Use   • Smoking status: Current Every Day Smoker     Packs/day: 0.50     Types: Cigarettes   • Smokeless tobacco: Never Used   Substance and Sexual Activity   • Alcohol use: Yes     Comment: 70 per week   • Drug use: No   • Sexual activity: Defer       No Known Allergies      Current Outpatient Medications:   •  cyclobenzaprine (FLEXERIL) 10 MG tablet, , Disp: , Rfl: 5  •  dicyclomine (BENTYL) 10 MG capsule, Every 8 (Eight) Hours., Disp: , Rfl:   •  Ferrous Sulfate 134  "MG tablet, ferrous sulfate 27 mg iron tablet  Take 1 tablet every day by oral route., Disp: , Rfl:   •  guaiFENesin (MUCINEX) 600 MG 12 hr tablet, Mucinex, Disp: , Rfl:   •  HYDROcodone-acetaminophen (NORCO) 7.5-325 MG per tablet, Every 8 (Eight) Hours., Disp: , Rfl:   •  ibuprofen (ADVIL,MOTRIN) 800 MG tablet, ibuprofen 800 mg tablet  TAKE 1 TABLET BY MOUTH THREE TIMES DAILY, Disp: , Rfl:   •  levothyroxine (SYNTHROID, LEVOTHROID) 125 MCG tablet, , Disp: , Rfl:   •  metoprolol succinate XL (TOPROL-XL) 50 MG 24 hr tablet, , Disp: , Rfl:   •  pantoprazole (PROTONIX) 40 MG EC tablet, , Disp: , Rfl: 12  •  pregabalin (LYRICA) 75 MG capsule, Every 12 (Twelve) Hours., Disp: , Rfl:      Review of Systems   Constitutional: Negative.    HENT: Negative.    Eyes: Negative.    Respiratory: Negative.    Cardiovascular: Positive for leg swelling.        Significant varicosities to BLE   Gastrointestinal: Negative.    Endocrine: Negative.    Genitourinary: Negative.    Musculoskeletal: Negative.    Skin: Negative.    Allergic/Immunologic: Negative.    Neurological: Negative.    Hematological: Negative.    Psychiatric/Behavioral: Negative.    All other systems reviewed and are negative.      /88   Pulse 69   Ht 188 cm (74\")   Wt 96.2 kg (212 lb)   SpO2 99%   BMI 27.22 kg/m²   Physical Exam   Constitutional: He is oriented to person, place, and time. He appears well-developed and well-nourished. No distress.   HENT:   Head: Normocephalic and atraumatic.   Mouth/Throat: Oropharynx is clear and moist and mucous membranes are normal.   Eyes: Pupils are equal, round, and reactive to light.   Neck: Normal range of motion. Neck supple. No JVD present. Carotid bruit is not present.   Cardiovascular: Normal rate, regular rhythm, S1 normal, S2 normal, normal heart sounds, intact distal pulses and normal pulses. Exam reveals no gallop and no friction rub.   No murmur heard.  Pulses:       Femoral pulses are 2+ on the right side, " and 2+ on the left side.       Popliteal pulses are 2+ on the right side, and 2+ on the left side.        Dorsalis pedis pulses are 2+ on the right side, and 2+ on the left side.        Posterior tibial pulses are 2+ on the right side, and 2+ on the left side.   Significant varicosities to BLE with swelling, new veins to chest wall beginning.   Pulmonary/Chest: Effort normal and breath sounds normal.   Abdominal: Soft. Normal appearance, normal aorta and bowel sounds are normal. He exhibits no abdominal bruit. There is no hepatosplenomegaly. There is no tenderness.   Musculoskeletal: Normal range of motion. He exhibits edema (bilateral lower extremities).     Vascular Status -  His right foot exhibits no edema. His left foot exhibits no edema.  Neurological: He is alert and oriented to person, place, and time. He has normal strength. No cranial nerve deficit.   Skin: Skin is warm, dry and intact. He is not diaphoretic.   Psychiatric: He has a normal mood and affect. His behavior is normal. Judgment and thought content normal. Cognition and memory are normal.       No results found.    Patient Active Problem List   Diagnosis   • Cigarette smoker   • Obesity (BMI 30-39.9)   • Numbness and tingling of both lower extremities   • Numbness and tingling of both upper extremities         ICD-10-CM ICD-9-CM   1. Chronic venous hypertension with inflammation involving both sides I87.323 459.32   2. Varicose veins of both lower extremities with inflammation I83.11 454.1    I83.12    3. Leg swelling M79.89 729.81   4. Cigarette smoker F17.210 305.1         Plan: After thoroughly evaluating Ezra King, I believe the best course of action is to proceed with some further testing.  I would like for him to undergo a CTV of the abdomen and pelvis.  I want to make sure that he does not have any significant proximal obstruction causing the significant venous insufficiency in both lower extremities.  It is unusual for someone to develop  significant varicosities at his age the severe without usually having a more proximal obstruction.  Once that is performed I will decide on further treatment options going forward.  He may be a great candidate for endovenous closure at some point.  For now, he will continue with compression stockings in the 20 to 30 mm pressure gradient range.  I will see him back in the next 1 to 2 weeks with this test. I advised Ezra of the risks of continuing to use tobacco, and I provided him with tobacco cessation educational materials in the After Visit Summary. During this visit, I spent 10 minutes counseling the patient regarding tobacco cessation.  The patient can continue taking their current medication regimen as previously planned.  This was all discussed in full with complete understanding.    Thank you for allowing me to participate in the care of your patient.  Please do not hesitate with any questions or concerns.  I will keep you aware of any further encounters with Ezra King.        Sincerely yours,         Angel Varner, DO         Scribed for Dr. Angel Varner by Lisa Leon APRN 4/25/2019 11:35 AM

## 2019-04-25 NOTE — TELEPHONE ENCOUNTER
I called the patient with his test and office visit apt for 4/30/19.  He said it would work for him and confirmed.  I gave him the NPO instructions 6 hours before test and meds are ok to take except Metformin.

## 2019-04-25 NOTE — TELEPHONE ENCOUNTER
I called and left message for pt to call me about rescheduling his office visit only.  We would like to leave his test on 4/30/19, but Dr. Varner will no be in on that day and will have to reschedule out.  Apt has to be with Telly.

## 2019-04-30 ENCOUNTER — HOSPITAL ENCOUNTER (OUTPATIENT)
Dept: CT IMAGING | Facility: HOSPITAL | Age: 39
Discharge: HOME | End: 2019-04-30
Admitting: SURGERY

## 2019-04-30 DIAGNOSIS — I87.323 CHRONIC VENOUS HYPERTENSION WITH INFLAMMATION INVOLVING BOTH SIDES: ICD-10-CM

## 2019-04-30 LAB — CREAT BLDA-MCNC: 1 MG/DL (ref 0.6–1.3)

## 2019-04-30 PROCEDURE — 74174 CTA ABD&PLVS W/CONTRAST: CPT

## 2019-04-30 PROCEDURE — 82565 ASSAY OF CREATININE: CPT

## 2019-04-30 PROCEDURE — 0 IOPAMIDOL PER 1 ML: Performed by: SURGERY

## 2019-04-30 RX ADMIN — IOPAMIDOL 100 ML: 755 INJECTION, SOLUTION INTRAVENOUS at 13:10

## 2019-05-01 ENCOUNTER — TELEPHONE (OUTPATIENT)
Dept: VASCULAR SURGERY | Facility: CLINIC | Age: 39
End: 2019-05-01

## 2019-05-01 NOTE — TELEPHONE ENCOUNTER
Called to remind Mr King of his appointment for Thursday, May 2nd, 2019 at 115 pm with Dr Varner.     When calling Mr King rang once and went to a busy signal, tried calling twice.     Test Complete [id number]

## 2019-05-02 ENCOUNTER — OFFICE VISIT (OUTPATIENT)
Dept: VASCULAR SURGERY | Facility: CLINIC | Age: 39
End: 2019-05-02

## 2019-05-02 VITALS
BODY MASS INDEX: 26.95 KG/M2 | HEIGHT: 74 IN | HEART RATE: 84 BPM | DIASTOLIC BLOOD PRESSURE: 82 MMHG | WEIGHT: 210 LBS | SYSTOLIC BLOOD PRESSURE: 130 MMHG | OXYGEN SATURATION: 100 %

## 2019-05-02 DIAGNOSIS — M79.89 LEG SWELLING: Primary | ICD-10-CM

## 2019-05-02 DIAGNOSIS — I83.12 VARICOSE VEINS OF BOTH LOWER EXTREMITIES WITH INFLAMMATION: ICD-10-CM

## 2019-05-02 DIAGNOSIS — I83.11 VARICOSE VEINS OF BOTH LOWER EXTREMITIES WITH INFLAMMATION: ICD-10-CM

## 2019-05-02 PROCEDURE — 99214 OFFICE O/P EST MOD 30 MIN: CPT | Performed by: NURSE PRACTITIONER

## 2019-05-02 PROCEDURE — 99407 BEHAV CHNG SMOKING > 10 MIN: CPT | Performed by: NURSE PRACTITIONER

## 2019-05-02 NOTE — PROGRESS NOTES
"5/2/2019       Michelle Lockhart  [address]  [place] [address]    Ezra King  1980    Chief Complaint   Patient presents with   • Follow-up     F/u after CTA of abdomen/pelvis.  Pt states that his legs are really swollen.       Dear Michelle Lockhart       HPI  I had the pleasure of seeing your patient Ezra King in the office today.  As you recall, Ezra King is a 38 y.o.  male who you are currently following for routine health maintenance.  He states he has had significant varicosities in both lower extremities for the past year and a half.  He even started getting varicosities in his early teenage years.  He denies any history of DVT.  He wears thigh high compressions stockings.    He reports ruptured varicosities in the past couple months.   He has seen Dr. Odonnell in [place].  He did have noninvasive testing today, which I did review in office.       Review of Systems   Constitutional: Negative.    HENT: Negative.    Eyes: Negative.    Respiratory: Negative.    Cardiovascular: Positive for leg swelling.        Significant varicosities to BLE   Gastrointestinal: Negative.    Endocrine: Negative.    Genitourinary: Negative.    Musculoskeletal: Negative.    Skin: Negative.    Allergic/Immunologic: Negative.    Neurological: Negative.    Hematological: Negative.    Psychiatric/Behavioral: Negative.    All other systems reviewed and are negative.      /82   Pulse 84   Ht 188 cm (74\")   Wt 95.3 kg (210 lb)   SpO2 100%   BMI 26.96 kg/m²   Physical Exam   Constitutional: He is oriented to person, place, and time. He appears well-developed and well-nourished. No distress.   HENT:   Head: Normocephalic and atraumatic.   Mouth/Throat: Oropharynx is clear and moist and mucous membranes are normal.   Eyes: Pupils are equal, round, and reactive to light.   Neck: Normal range of motion. Neck supple. No JVD present. Carotid bruit is not present.   Cardiovascular: Normal rate, regular rhythm, S1 normal, S2 normal, " normal heart sounds, intact distal pulses and normal pulses. Exam reveals no gallop and no friction rub.   No murmur heard.  Pulses:       Femoral pulses are 2+ on the right side, and 2+ on the left side.       Popliteal pulses are 2+ on the right side, and 2+ on the left side.        Dorsalis pedis pulses are 2+ on the right side, and 2+ on the left side.        Posterior tibial pulses are 2+ on the right side, and 2+ on the left side.   Significant varicosities to BLE with swelling, new veins to chest wall beginning.  Venous stasis to BLE   Pulmonary/Chest: Effort normal and breath sounds normal.   Abdominal: Soft. Normal appearance, normal aorta and bowel sounds are normal. He exhibits no abdominal bruit. There is no hepatosplenomegaly. There is no tenderness.   Musculoskeletal: Normal range of motion. He exhibits edema (bilateral lower extremities).     Vascular Status -  His right foot exhibits no edema. His left foot exhibits no edema.  Neurological: He is alert and oriented to person, place, and time. He has normal strength. No cranial nerve deficit.   Skin: Skin is warm, dry and intact. He is not diaphoretic.   Psychiatric: He has a normal mood and affect. His behavior is normal. Judgment and thought content normal. Cognition and memory are normal.       Ct Angiogram Abdomen Pelvis With & Without Contrast    Result Date: 4/30/2019  Narrative: CT angiogram abdomen and pelvis with contrast 4/30/2019 1:07 PM CDT  HISTORY: Possible IVC occlusion  COMPARISON: None  DLP: 840 mGy centimeters  TECHNIQUE: Helical tomographic images of the abdomen and pelvis utilizing angiographic protocol were obtained after the intravenous infusion of contrast according to an angiographic protocol. Subsequent venous phase images acquired at 90 seconds to evaluate the IVC. Multiplanar and 3 D reformatted images were provided for review.  FINDINGS:  Calcified granuloma in the left lung base. The lung bases are otherwise clear.  Included portions of the mediastinum are unremarkable.  LIVER: No focal liver lesion. The hepatic vasculature is patent.  BILIARY SYSTEM: The gallbladder is unremarkable. No intrahepatic or extrahepatic ductal dilatation.  PANCREAS: No focal pancreatic lesion.  SPLEEN: Unremarkable.  KIDNEYS AND ADRENALS: Bilateral kidneys and adrenal glands are unremarkable. The ureters are decompressed and normal in appearance.  RETROPERITONEUM: No mass, lymphadenopathy or hemorrhage.  GI TRACT: The stomach and small bowel are unremarkable. Moderate colonic stool. No inflammatory thickening of the bowel wall. The appendix is visualized and unremarkable.  VASCULATURE: Abdominal aorta is normal in caliber. No significant atheromatous plaque appreciated. Celiac and SMA are unremarkable. The LIONEL is patent. Bilateral common, internal and external iliac arteries are patent and normal in caliber. Hepatic and portal veins are patent. Main portal vein is normal in caliber. Splenic and renal veins are unremarkable. The internal, external and common iliac veins are patent and normal in caliber. IVC is patent and normal in caliber.  OTHER: Bony structures are unremarkable. No acute fracture. Small fat-containing umbilical hernia.  PELVIS: No mass lesion, fluid collection or significant lymphadenopathy is seen in the pelvis. The urinary bladder is normal in appearance.      Impression: 1. Normal CT angiogram of the abdomen and pelvis. 2. Normal CT venogram of the abdomen and pelvis. Bilateral iliac veins and IVC are patent. 3. Small fat-containing umbilical hernia.  This report was finalized on 04/30/2019 14:30 by Dr Daren Sheridan, .      Patient Active Problem List   Diagnosis   • Cigarette smoker   • Obesity (BMI 30-39.9)   • Numbness and tingling of both lower extremities   • Numbness and tingling of both upper extremities         ICD-10-CM ICD-9-CM   1. Leg swelling M79.89 729.81   2. Varicose veins of both lower extremities with  inflammation I83.11 454.1    I83.12          Plan: After thoroughly evaluating Ezra King, I believe the best course of action is to proceed with some further testing including a venous valvular insufficiency study.   I did review his CTA which shows no proximal obstruction causing his significant venous insufficiency.  He may be a great candidate for endovenous closure as this is causing him a significant amount of discomfort.  He has tried elevation while he is not on his feet.  For now, he will continue with compression stockings in the 20 to 30 mm pressure gradient range.  I will see him back in the next 1 to 2 weeks with this test. I advised Ezra of the risks of continuing to use tobacco, and I provided him with tobacco cessation educational materials in the After Visit Summary. During this visit, I spent 10 minutes counseling the patient regarding tobacco cessation.  The patient can continue taking their current medication regimen as previously planned.  This was all discussed in full with complete understanding.    Thank you for allowing me to participate in the care of your patient.  Please do not hesitate with any questions or concerns.  I will keep you aware of any further encounters with Ezra King.        Sincerely yours,         OLIVER Alonzo

## 2019-05-21 ENCOUNTER — OFFICE VISIT (OUTPATIENT)
Dept: VASCULAR SURGERY | Facility: CLINIC | Age: 39
End: 2019-05-21

## 2019-05-21 ENCOUNTER — HOSPITAL ENCOUNTER (OUTPATIENT)
Dept: ULTRASOUND IMAGING | Facility: HOSPITAL | Age: 39
Discharge: HOME | End: 2019-05-21
Admitting: NURSE PRACTITIONER

## 2019-05-21 VITALS
WEIGHT: 205 LBS | HEART RATE: 85 BPM | BODY MASS INDEX: 26.31 KG/M2 | DIASTOLIC BLOOD PRESSURE: 86 MMHG | HEIGHT: 74 IN | SYSTOLIC BLOOD PRESSURE: 134 MMHG | OXYGEN SATURATION: 98 %

## 2019-05-21 DIAGNOSIS — I87.2 VENOUS (PERIPHERAL) INSUFFICIENCY: Primary | ICD-10-CM

## 2019-05-21 DIAGNOSIS — I83.12 VARICOSE VEINS OF BOTH LOWER EXTREMITIES WITH INFLAMMATION: ICD-10-CM

## 2019-05-21 DIAGNOSIS — F17.210 CIGARETTE SMOKER: ICD-10-CM

## 2019-05-21 DIAGNOSIS — I83.11 VARICOSE VEINS OF BOTH LOWER EXTREMITIES WITH INFLAMMATION: ICD-10-CM

## 2019-05-21 DIAGNOSIS — M79.89 LEG SWELLING: ICD-10-CM

## 2019-05-21 PROCEDURE — 93970 EXTREMITY STUDY: CPT | Performed by: SURGERY

## 2019-05-21 PROCEDURE — 99214 OFFICE O/P EST MOD 30 MIN: CPT | Performed by: NURSE PRACTITIONER

## 2019-05-21 PROCEDURE — 99406 BEHAV CHNG SMOKING 3-10 MIN: CPT | Performed by: NURSE PRACTITIONER

## 2019-05-21 PROCEDURE — 93970 EXTREMITY STUDY: CPT

## 2019-05-21 NOTE — PROGRESS NOTES
"5/21/2019       Michelle Lockhart  [address]  [place] [address]    Ezra King IV  1980    Chief Complaint   Patient presents with   • Follow-up     2 Week Follow Up For Leg Swelling and Varicose Veins of both lower extremities with inflammation. Test 05/21/2019  US pad venous lower ext bilat. Patient denies any stroke like symptoms.        Dear Michelle Lockhart       HPI  I had the pleasure of seeing your patient Ezra King IV in the office today.  As you recall, Ezra King IV is a 38 y.o.  male who you are currently following for routine health maintenance.  He states he has had significant varicosities in both lower extremities for the past year and a half.  He even started getting varicosities in his early teenage years.  He denies any history of DVT.  He wears thigh high compressions stockings.    He reports ruptured varicosities in the past couple months.   He has seen Dr. Odonnell in [place].  He did have noninvasive testing today, which I did review in office.       Review of Systems   Constitutional: Negative.    HENT: Negative.    Eyes: Negative.    Respiratory: Negative.    Cardiovascular: Positive for leg swelling.        Significant varicosities to BLE   Gastrointestinal: Negative.    Endocrine: Negative.    Genitourinary: Negative.    Musculoskeletal: Negative.    Skin: Negative.    Allergic/Immunologic: Negative.    Neurological: Negative.    Hematological: Negative.    Psychiatric/Behavioral: Negative.    All other systems reviewed and are negative.      /86 (BP Location: Right arm, Patient Position: Sitting, Cuff Size: Adult)   Pulse 85   Ht 188 cm (74\")   Wt 93 kg (205 lb)   SpO2 98%   BMI 26.32 kg/m²   Physical Exam   Constitutional: He is oriented to person, place, and time. He appears well-developed and well-nourished. No distress.   HENT:   Head: Normocephalic and atraumatic.   Mouth/Throat: Oropharynx is clear and moist and mucous membranes are normal.   Eyes: Pupils are " equal, round, and reactive to light.   Neck: Normal range of motion. Neck supple. No JVD present. Carotid bruit is not present.   Cardiovascular: Normal rate, regular rhythm, S1 normal, S2 normal, normal heart sounds, intact distal pulses and normal pulses. Exam reveals no gallop and no friction rub.   No murmur heard.  Pulses:       Femoral pulses are 2+ on the right side, and 2+ on the left side.       Popliteal pulses are 2+ on the right side, and 2+ on the left side.        Dorsalis pedis pulses are 2+ on the right side, and 2+ on the left side.        Posterior tibial pulses are 2+ on the right side, and 2+ on the left side.   Significant varicosities to BLE with swelling, new veins to chest wall beginning.  Venous stasis to BLE   Pulmonary/Chest: Effort normal and breath sounds normal.   Abdominal: Soft. Normal appearance, normal aorta and bowel sounds are normal. He exhibits no abdominal bruit. There is no hepatosplenomegaly. There is no tenderness.   Musculoskeletal: Normal range of motion. He exhibits edema (bilateral lower extremities).     Vascular Status -  His right foot exhibits no edema. His left foot exhibits no edema.  Neurological: He is alert and oriented to person, place, and time. He has normal strength. No cranial nerve deficit.   Skin: Skin is warm, dry and intact. He is not diaphoretic.   Psychiatric: He has a normal mood and affect. His behavior is normal. Judgment and thought content normal. Cognition and memory are normal.       Ct Angiogram Abdomen Pelvis With & Without Contrast    Result Date: 4/30/2019  Narrative: CT angiogram abdomen and pelvis with contrast 4/30/2019 1:07 PM CDT  HISTORY: Possible IVC occlusion  COMPARISON: None  DLP: 840 mGy centimeters  TECHNIQUE: Helical tomographic images of the abdomen and pelvis utilizing angiographic protocol were obtained after the intravenous infusion of contrast according to an angiographic protocol. Subsequent venous phase images acquired  at 90 seconds to evaluate the IVC. Multiplanar and 3 D reformatted images were provided for review.  FINDINGS:  Calcified granuloma in the left lung base. The lung bases are otherwise clear. Included portions of the mediastinum are unremarkable.  LIVER: No focal liver lesion. The hepatic vasculature is patent.  BILIARY SYSTEM: The gallbladder is unremarkable. No intrahepatic or extrahepatic ductal dilatation.  PANCREAS: No focal pancreatic lesion.  SPLEEN: Unremarkable.  KIDNEYS AND ADRENALS: Bilateral kidneys and adrenal glands are unremarkable. The ureters are decompressed and normal in appearance.  RETROPERITONEUM: No mass, lymphadenopathy or hemorrhage.  GI TRACT: The stomach and small bowel are unremarkable. Moderate colonic stool. No inflammatory thickening of the bowel wall. The appendix is visualized and unremarkable.  VASCULATURE: Abdominal aorta is normal in caliber. No significant atheromatous plaque appreciated. Celiac and SMA are unremarkable. The LIONEL is patent. Bilateral common, internal and external iliac arteries are patent and normal in caliber. Hepatic and portal veins are patent. Main portal vein is normal in caliber. Splenic and renal veins are unremarkable. The internal, external and common iliac veins are patent and normal in caliber. IVC is patent and normal in caliber.  OTHER: Bony structures are unremarkable. No acute fracture. Small fat-containing umbilical hernia.  PELVIS: No mass lesion, fluid collection or significant lymphadenopathy is seen in the pelvis. The urinary bladder is normal in appearance.      Impression: 1. Normal CT angiogram of the abdomen and pelvis. 2. Normal CT venogram of the abdomen and pelvis. Bilateral iliac veins and IVC are patent. 3. Small fat-containing umbilical hernia.  This report was finalized on 04/30/2019 14:30 by Dr Daren Sheridan, .    Us Venous Doppler Lower Extremity Bilateral (duplex)    Result Date: 5/21/2019  Narrative: History: Bilateral lower  extremity swelling  Comment: Venous valvular insufficiency testing was performed in both legs using duplex ultrasound with rapid cuff deflation technique.  The common femoral veins, popliteal veins, posterior tibial veins, greater saphenous veins, and lesser saphenous veins were interrogated bilaterally.   On the right, the greater saphenous vein at the junction measures 9.8 mm. In the mid thigh measures 3.2 mm. Above the knee measures 3.4 mm. The mid calf measures 2.3mm however becomes enlarged with measurements up to 8.8 mm.. The ankle measures 4.3 mm. There is greater than 0.5 seconds of reflux from the above-knee area to the ankle. Lesser saphenous vein is 2.4mm at the knee, 2.4 mm at the mid calf, and 3.0 mm at the ankle. There is greater than 0.5 seconds of reflux from the knee to the mid calf. There are numerous varicosities noted from the proximal thigh through the ankle. Also there is 1.1 seconds of reflux noted in the distal femoral vein, and 3.4 seconds of reflux noted in the popliteal vein.. There is no evidence of DVT.  On the left, the greater saphenous vein at the junction measures 4.9 mm. In the mid thigh measures 3.5 mm. Above the knee measured 2.7 mm. The mid calf measures 1.7 mm. The ankle measured 3.6 mm. There is greater than 0.5 seconds of reflux from the mid thigh to the mid calf. Lesser saphenous vein is 4.4mm at the knee, 2.4 mm at the mid calf, and 2.0 mm at the ankle. There is greater than 0.5 seconds of reflux at the knee. There are multiple varicosities noted from the proximal calf to the ankle. There is no evidence of DVT.      Impression: Significant reflux in the bilateral greater saphenous veins as noted above. There is also evidence of reflux on the right in the distal femoral vein as well as the popliteal vein. There is reflux in the right lesser saphenous vein from the knee to the mid calf as well as in the left lesser saphenous vein at the knee. There is no evidence of DVT  bilaterally.   This report was finalized on 05/21/2019 14:42 by Dr. Olvin Cervantes MD.      Patient Active Problem List   Diagnosis   • Cigarette smoker   • Obesity (BMI 30-39.9)   • Numbness and tingling of both lower extremities   • Numbness and tingling of both upper extremities         ICD-10-CM ICD-9-CM   1. Venous (peripheral) insufficiency I87.2 459.81   2. Cigarette smoker F17.210 305.1         Plan: After thoroughly evaluating Ezra King IV, I believe the best course of action is to proceed with a right lower extremity radiofrequency ablation, followed by the left shortly thereafter.  Sting does show significant venous insufficiency to bilateral lower extremities.  He has tried elevation while he is not on his feet.  For now, he will continue with compression stockings in the 20 to 30 mm pressure gradient range.I did  extensively on smoking cessation, and the patient was advised of the continued risks of smoking.  I provided over 5 minutes counseling on this matter.  The patient can continue taking their current medication regimen as previously planned.  This was all discussed in full with complete understanding.    Thank you for allowing me to participate in the care of your patient.  Please do not hesitate with any questions or concerns.  I will keep you aware of any further encounters with Ezra King IV.        Sincerely yours,         OLIVER Alonzo

## 2019-05-21 NOTE — PATIENT INSTRUCTIONS
Steps to Quit Smoking  Smoking tobacco can be harmful to your health and can affect almost every organ in your body. Smoking puts you, and those around you, at risk for developing many serious chronic diseases. Quitting smoking is difficult, but it is one of the best things that you can do for your health. It is never too late to quit.  What are the benefits of quitting smoking?  When you quit smoking, you lower your risk of developing serious diseases and conditions, such as:  · Lung cancer or lung disease, such as COPD.  · Heart disease.  · Stroke.  · Heart attack.  · Infertility.  · Osteoporosis and bone fractures.    Additionally, symptoms such as coughing, wheezing, and shortness of breath may get better when you quit. You may also find that you get sick less often because your body is stronger at fighting off colds and infections. If you are pregnant, quitting smoking can help to reduce your chances of having a baby of low birth weight.  How do I get ready to quit?  When you decide to quit smoking, create a plan to make sure that you are successful. Before you quit:  · Pick a date to quit. Set a date within the next two weeks to give you time to prepare.  · Write down the reasons why you are quitting. Keep this list in places where you will see it often, such as on your bathroom mirror or in your car or wallet.  · Identify the people, places, things, and activities that make you want to smoke (triggers) and avoid them. Make sure to take these actions:  ? Throw away all cigarettes at home, at work, and in your car.  ? Throw away smoking accessories, such as ashtrays and lighters.  ? Clean your car and make sure to empty the ashtray.  ? Clean your home, including curtains and carpets.  · Tell your family, friends, and coworkers that you are quitting. Support from your loved ones can make quitting easier.  · Talk with your health care provider about your options for quitting smoking.  · Find out what treatment  options are covered by your health insurance.    What strategies can I use to quit smoking?  Talk with your healthcare provider about different strategies to quit smoking. Some strategies include:  · Quitting smoking altogether instead of gradually lessening how much you smoke over a period of time. Research shows that quitting “cold turkey” is more successful than gradually quitting.  · Attending in-person counseling to help you build problem-solving skills. You are more likely to have success in quitting if you attend several counseling sessions. Even short sessions of 10 minutes can be effective.  · Finding resources and support systems that can help you to quit smoking and remain smoke-free after you quit. These resources are most helpful when you use them often. They can include:  ? Online chats with a counselor.  ? Telephone quitlines.  ? Printed self-help materials.  ? Support groups or group counseling.  ? Text messaging programs.  ? Mobile phone applications.  · Taking medicines to help you quit smoking. (If you are pregnant or breastfeeding, talk with your health care provider first.) Some medicines contain nicotine and some do not. Both types of medicines help with cravings, but the medicines that include nicotine help to relieve withdrawal symptoms. Your health care provider may recommend:  ? Nicotine patches, gum, or lozenges.  ? Nicotine inhalers or sprays.  ? Non-nicotine medicine that is taken by mouth.    Talk with your health care provider about combining strategies, such as taking medicines while you are also receiving in-person counseling. Using these two strategies together makes you more likely to succeed in quitting than if you used either strategy on its own.  If you are pregnant or breastfeeding, talk with your health care provider about finding counseling or other support strategies to quit smoking. Do not take medicine to help you quit smoking unless told to do so by your health care  provider.  What things can I do to make it easier to quit?  Quitting smoking might feel overwhelming at first, but there is a lot that you can do to make it easier. Take these important actions:  · Reach out to your family and friends and ask that they support and encourage you during this time. Call telephone quitlines, reach out to support groups, or work with a counselor for support.  · Ask people who smoke to avoid smoking around you.  · Avoid places that trigger you to smoke, such as bars, parties, or smoke-break areas at work.  · Spend time around people who do not smoke.  · Lessen stress in your life, because stress can be a smoking trigger for some people. To lessen stress, try:  ? Exercising regularly.  ? Deep-breathing exercises.  ? Yoga.  ? Meditating.  ? Performing a body scan. This involves closing your eyes, scanning your body from head to toe, and noticing which parts of your body are particularly tense. Purposefully relax the muscles in those areas.  · Download or purchase mobile phone or tablet apps (applications) that can help you stick to your quit plan by providing reminders, tips, and encouragement. There are many free apps, such as QuitGuide from the CDC (Centers for Disease Control and Prevention). You can find other support for quitting smoking (smoking cessation) through smokefree.gov and other websites.    How will I feel when I quit smoking?  Within the first 24 hours of quitting smoking, you may start to feel some withdrawal symptoms. These symptoms are usually most noticeable 2-3 days after quitting, but they usually do not last beyond 2-3 weeks. Changes or symptoms that you might experience include:  · Mood swings.  · Restlessness, anxiety, or irritation.  · Difficulty concentrating.  · Dizziness.  · Strong cravings for sugary foods in addition to nicotine.  · Mild weight gain.  · Constipation.  · Nausea.  · Coughing or a sore throat.  · Changes in how your medicines work in your  body.  · A depressed mood.  · Difficulty sleeping (insomnia).    After the first 2-3 weeks of quitting, you may start to notice more positive results, such as:  · Improved sense of smell and taste.  · Decreased coughing and sore throat.  · Slower heart rate.  · Lower blood pressure.  · Clearer skin.  · The ability to breathe more easily.  · Fewer sick days.    Quitting smoking is very challenging for most people. Do not get discouraged if you are not successful the first time. Some people need to make many attempts to quit before they achieve long-term success. Do your best to stick to your quit plan, and talk with your health care provider if you have any questions or concerns.  This information is not intended to replace advice given to you by your health care provider. Make sure you discuss any questions you have with your health care provider.  Document Released: 12/12/2002 Document Revised: 07/24/2018 Document Reviewed: 05/03/2016  [employer] Patient Education © 2019 [employer]

## 2019-05-21 NOTE — H&P
5/21/2019       Michelle Lockhart  [address]  [place] [address]    Ezra King IV  1980    Chief Complaint   Patient presents with   • Follow-up     2 Week Follow Up For Leg Swelling and Varicose Veins of both lower extremities with inflammation. Test 05/21/2019  US pad venous lower ext bilat. Patient denies any stroke like symptoms.        Dear Michelle Lockhart       HPI  I had the pleasure of seeing your patient Ezra King IV in the office today.  As you recall, Ezra King IV is a 38 y.o.  male who you are currently following for routine health maintenance.  He states he has had significant varicosities in both lower extremities for the past year and a half.  He even started getting varicosities in his early teenage years.  He denies any history of DVT.  He wears thigh high compressions stockings.    He reports ruptured varicosities in the past couple months.   He has seen Dr. Odonnell in [place].  He did have noninvasive testing today, which I did review in office.     Past Medical History:   Diagnosis Date   • Arthritis    • Hypertension    • Hypothyroid    • Irritable bowel      Past Surgical History:   Procedure Laterality Date   • EPIDURAL BLOCK     • KNEE SURGERY Left     x2   • KNEE SURGERY Right     x2   • MOUTH SURGERY     • VASECTOMY       History reviewed. No pertinent family history.  Social History     Tobacco Use   • Smoking status: Current Every Day Smoker     Packs/day: 0.50     Types: Cigarettes   • Smokeless tobacco: Never Used   Substance Use Topics   • Alcohol use: Yes     Comment: 70 per week   • Drug use: No     No Known Allergies    Current Outpatient Medications:   •  cyclobenzaprine (FLEXERIL) 10 MG tablet, , Disp: , Rfl: 5  •  dicyclomine (BENTYL) 10 MG capsule, Every 8 (Eight) Hours., Disp: , Rfl:   •  Ferrous Sulfate 134 MG tablet, ferrous sulfate 27 mg iron tablet  Take 1 tablet every day by oral route., Disp: , Rfl:   •  guaiFENesin (MUCINEX) 600 MG 12 hr tablet, Mucinex, Disp:  ", Rfl:   •  HYDROcodone-acetaminophen (NORCO) 7.5-325 MG per tablet, Every 8 (Eight) Hours., Disp: , Rfl:   •  ibuprofen (ADVIL,MOTRIN) 800 MG tablet, ibuprofen 800 mg tablet  TAKE 1 TABLET BY MOUTH THREE TIMES DAILY, Disp: , Rfl:   •  levothyroxine (SYNTHROID, LEVOTHROID) 125 MCG tablet, , Disp: , Rfl:   •  metoprolol succinate XL (TOPROL-XL) 50 MG 24 hr tablet, , Disp: , Rfl:   •  pantoprazole (PROTONIX) 40 MG EC tablet, , Disp: , Rfl: 12  •  pregabalin (LYRICA) 75 MG capsule, Every 12 (Twelve) Hours., Disp: , Rfl:       Review of Systems   Constitutional: Negative.    HENT: Negative.    Eyes: Negative.    Respiratory: Negative.    Cardiovascular: Positive for leg swelling.        Significant varicosities to BLE   Gastrointestinal: Negative.    Endocrine: Negative.    Genitourinary: Negative.    Musculoskeletal: Negative.    Skin: Negative.    Allergic/Immunologic: Negative.    Neurological: Negative.    Hematological: Negative.    Psychiatric/Behavioral: Negative.    All other systems reviewed and are negative.      /86 (BP Location: Right arm, Patient Position: Sitting, Cuff Size: Adult)   Pulse 85   Ht 188 cm (74\")   Wt 93 kg (205 lb)   SpO2 98%   BMI 26.32 kg/m²   Physical Exam   Constitutional: He is oriented to person, place, and time. He appears well-developed and well-nourished. No distress.   HENT:   Head: Normocephalic and atraumatic.   Mouth/Throat: Oropharynx is clear and moist and mucous membranes are normal.   Eyes: Pupils are equal, round, and reactive to light.   Neck: Normal range of motion. Neck supple. No JVD present. Carotid bruit is not present.   Cardiovascular: Normal rate, regular rhythm, S1 normal, S2 normal, normal heart sounds, intact distal pulses and normal pulses. Exam reveals no gallop and no friction rub.   No murmur heard.  Pulses:       Femoral pulses are 2+ on the right side, and 2+ on the left side.       Popliteal pulses are 2+ on the right side, and 2+ on the left " side.        Dorsalis pedis pulses are 2+ on the right side, and 2+ on the left side.        Posterior tibial pulses are 2+ on the right side, and 2+ on the left side.   Significant varicosities to BLE with swelling, new veins to chest wall beginning.  Venous stasis to BLE   Pulmonary/Chest: Effort normal and breath sounds normal.   Abdominal: Soft. Normal appearance, normal aorta and bowel sounds are normal. He exhibits no abdominal bruit. There is no hepatosplenomegaly. There is no tenderness.   Musculoskeletal: Normal range of motion. He exhibits edema (bilateral lower extremities).     Vascular Status -  His right foot exhibits no edema. His left foot exhibits no edema.  Neurological: He is alert and oriented to person, place, and time. He has normal strength. No cranial nerve deficit.   Skin: Skin is warm, dry and intact. He is not diaphoretic.   Psychiatric: He has a normal mood and affect. His behavior is normal. Judgment and thought content normal. Cognition and memory are normal.       Ct Angiogram Abdomen Pelvis With & Without Contrast    Result Date: 4/30/2019  Narrative: CT angiogram abdomen and pelvis with contrast 4/30/2019 1:07 PM CDT  HISTORY: Possible IVC occlusion  COMPARISON: None  DLP: 840 mGy centimeters  TECHNIQUE: Helical tomographic images of the abdomen and pelvis utilizing angiographic protocol were obtained after the intravenous infusion of contrast according to an angiographic protocol. Subsequent venous phase images acquired at 90 seconds to evaluate the IVC. Multiplanar and 3 D reformatted images were provided for review.  FINDINGS:  Calcified granuloma in the left lung base. The lung bases are otherwise clear. Included portions of the mediastinum are unremarkable.  LIVER: No focal liver lesion. The hepatic vasculature is patent.  BILIARY SYSTEM: The gallbladder is unremarkable. No intrahepatic or extrahepatic ductal dilatation.  PANCREAS: No focal pancreatic lesion.  SPLEEN:  Unremarkable.  KIDNEYS AND ADRENALS: Bilateral kidneys and adrenal glands are unremarkable. The ureters are decompressed and normal in appearance.  RETROPERITONEUM: No mass, lymphadenopathy or hemorrhage.  GI TRACT: The stomach and small bowel are unremarkable. Moderate colonic stool. No inflammatory thickening of the bowel wall. The appendix is visualized and unremarkable.  VASCULATURE: Abdominal aorta is normal in caliber. No significant atheromatous plaque appreciated. Celiac and SMA are unremarkable. The LIONEL is patent. Bilateral common, internal and external iliac arteries are patent and normal in caliber. Hepatic and portal veins are patent. Main portal vein is normal in caliber. Splenic and renal veins are unremarkable. The internal, external and common iliac veins are patent and normal in caliber. IVC is patent and normal in caliber.  OTHER: Bony structures are unremarkable. No acute fracture. Small fat-containing umbilical hernia.  PELVIS: No mass lesion, fluid collection or significant lymphadenopathy is seen in the pelvis. The urinary bladder is normal in appearance.      Impression: 1. Normal CT angiogram of the abdomen and pelvis. 2. Normal CT venogram of the abdomen and pelvis. Bilateral iliac veins and IVC are patent. 3. Small fat-containing umbilical hernia.  This report was finalized on 04/30/2019 14:30 by Dr Daren Sheridan, .    Us Venous Doppler Lower Extremity Bilateral (duplex)    Result Date: 5/21/2019  Narrative: History: Bilateral lower extremity swelling  Comment: Venous valvular insufficiency testing was performed in both legs using duplex ultrasound with rapid cuff deflation technique.  The common femoral veins, popliteal veins, posterior tibial veins, greater saphenous veins, and lesser saphenous veins were interrogated bilaterally.   On the right, the greater saphenous vein at the junction measures 9.8 mm. In the mid thigh measures 3.2 mm. Above the knee measures 3.4 mm. The mid calf  measures 2.3mm however becomes enlarged with measurements up to 8.8 mm.. The ankle measures 4.3 mm. There is greater than 0.5 seconds of reflux from the above-knee area to the ankle. Lesser saphenous vein is 2.4mm at the knee, 2.4 mm at the mid calf, and 3.0 mm at the ankle. There is greater than 0.5 seconds of reflux from the knee to the mid calf. There are numerous varicosities noted from the proximal thigh through the ankle. Also there is 1.1 seconds of reflux noted in the distal femoral vein, and 3.4 seconds of reflux noted in the popliteal vein.. There is no evidence of DVT.  On the left, the greater saphenous vein at the junction measures 4.9 mm. In the mid thigh measures 3.5 mm. Above the knee measured 2.7 mm. The mid calf measures 1.7 mm. The ankle measured 3.6 mm. There is greater than 0.5 seconds of reflux from the mid thigh to the mid calf. Lesser saphenous vein is 4.4mm at the knee, 2.4 mm at the mid calf, and 2.0 mm at the ankle. There is greater than 0.5 seconds of reflux at the knee. There are multiple varicosities noted from the proximal calf to the ankle. There is no evidence of DVT.      Impression: Significant reflux in the bilateral greater saphenous veins as noted above. There is also evidence of reflux on the right in the distal femoral vein as well as the popliteal vein. There is reflux in the right lesser saphenous vein from the knee to the mid calf as well as in the left lesser saphenous vein at the knee. There is no evidence of DVT bilaterally.   This report was finalized on 05/21/2019 14:42 by Dr. Olvin Cervantes MD.      Patient Active Problem List   Diagnosis   • Cigarette smoker   • Obesity (BMI 30-39.9)   • Numbness and tingling of both lower extremities   • Numbness and tingling of both upper extremities         ICD-10-CM ICD-9-CM   1. Venous (peripheral) insufficiency I87.2 459.81   2. Cigarette smoker F17.210 305.1         Plan: After thoroughly evaluating Ezra TAVARES Fernando LUI, I believe  the best course of action is to proceed with a right lower extremity radiofrequency ablation, followed by the left shortly thereafter.  Sting does show significant venous insufficiency to bilateral lower extremities.  He has tried elevation while he is not on his feet.  For now, he will continue with compression stockings in the 20 to 30 mm pressure gradient range.I did  extensively on smoking cessation, and the patient was advised of the continued risks of smoking.  I provided over 5 minutes counseling on this matter.  The patient can continue taking their current medication regimen as previously planned.  This was all discussed in full with complete understanding.    Thank you for allowing me to participate in the care of your patient.  Please do not hesitate with any questions or concerns.  I will keep you aware of any further encounters with Ezra King IV.        Sincerely yours,         OLIVER Alonzo

## 2019-05-23 ENCOUNTER — TELEPHONE (OUTPATIENT)
Dept: VASCULAR SURGERY | Facility: CLINIC | Age: 39
End: 2019-05-23

## 2019-05-23 NOTE — TELEPHONE ENCOUNTER
Spoke with patient and confirmed that he is waiting on Medicaid to be finalized.  Advised that surgery would not be able to be scheduled until it was finalized.  Patient expressed understanding for all that was discussed and advised that he would contact us once it was all taken care of.

## 2019-06-17 ENCOUNTER — TELEPHONE (OUTPATIENT)
Dept: VASCULAR SURGERY | Facility: CLINIC | Age: 39
End: 2019-06-17

## 2019-06-17 NOTE — TELEPHONE ENCOUNTER
Left message requesting updated information on Medicaid.  Cannot schedule patient procedure until Medicaid is updated.

## 2019-10-29 ENCOUNTER — TELEPHONE (OUTPATIENT)
Dept: GASTROENTEROLOGY | Facility: CLINIC | Age: 39
End: 2019-10-29

## 2019-10-29 ENCOUNTER — OFFICE VISIT (OUTPATIENT)
Dept: GASTROENTEROLOGY | Facility: CLINIC | Age: 39
End: 2019-10-29

## 2019-10-29 VITALS
WEIGHT: 210 LBS | OXYGEN SATURATION: 98 % | DIASTOLIC BLOOD PRESSURE: 84 MMHG | TEMPERATURE: 98 F | HEART RATE: 90 BPM | HEIGHT: 74 IN | BODY MASS INDEX: 26.95 KG/M2 | SYSTOLIC BLOOD PRESSURE: 136 MMHG

## 2019-10-29 DIAGNOSIS — K74.60 CIRRHOSIS OF LIVER WITHOUT ASCITES, UNSPECIFIED HEPATIC CIRRHOSIS TYPE (HCC): Primary | ICD-10-CM

## 2019-10-29 DIAGNOSIS — R19.4 CHANGE IN BOWEL HABITS: ICD-10-CM

## 2019-10-29 PROCEDURE — 99214 OFFICE O/P EST MOD 30 MIN: CPT | Performed by: NURSE PRACTITIONER

## 2019-10-29 RX ORDER — CHLORDIAZEPOXIDE HYDROCHLORIDE 25 MG/1
25 CAPSULE, GELATIN COATED ORAL 3 TIMES DAILY PRN
COMMUNITY
End: 2020-09-21

## 2019-10-29 RX ORDER — POTASSIUM CHLORIDE 750 MG/1
10 TABLET, FILM COATED, EXTENDED RELEASE ORAL 2 TIMES DAILY
COMMUNITY

## 2019-10-29 RX ORDER — AMLODIPINE BESYLATE AND BENAZEPRIL HYDROCHLORIDE 10; 20 MG/1; MG/1
1 CAPSULE ORAL DAILY
Status: ON HOLD | COMMUNITY
End: 2019-11-25

## 2019-10-29 NOTE — TELEPHONE ENCOUNTER
Patient is coming in office today for elevated LFT    Can you please call Michelle Lockhart office and obtain most recent labs (Hepatitis, cmp, cbc)    All I have is the bili      Thank you

## 2019-10-29 NOTE — PROGRESS NOTES
Chief Complaint   Patient presents with   • Abdominal Pain     having abd. 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  Currently heartburn described as controlled with avoidance of irritating foods and daily Protonix.  He is followed by [facility] vascular for history of varicosities since teenager. New diagnosis of cirrhosis after ER evaluation 2 weeks ago for abdominal bloating.  CT scan at  [place] ER showed abnormal appearance to liver (per patient).  He has a history of consuming case of beer and 1/5 whiskey per day from age of [age].  Over past 5 years he has continued to consume case of beer per day.   CTA 4/2019 showed vasculature of liver to be patent.  No history of IV drug use, negative Hepatitis C testing per patient.   He complain of bowel habit changing from 1-2 daily formed stool to having 3-6 loose BM per day x 6 months.    He has had numerous medication changes over past year.   He is unable to relate changes in medication to bowel habit.  No previous colonoscopy     EGD 2/1/2019    Past Medical History:   Diagnosis Date   • Arthritis    • Hypertension    • Hypothyroid    • Irritable bowel        Past Surgical History:   Procedure Laterality Date   • EPIDURAL BLOCK     • KNEE SURGERY Left     x2   • KNEE SURGERY Right     x2   • MOUTH SURGERY     • VASECTOMY         Outpatient Medications Marked as Taking for the 10/29/19 encounter (Office Visit) with Fletcher Lee APRN   Medication Sig Dispense Refill   • amLODIPine-benazepril (LOTREL) 10-20 MG per capsule Take 1 capsule by mouth Daily.     • chlordiazePOXIDE (LIBRIUM) 25 MG capsule Take 25 mg by mouth 3 (Three) Times a Day As Needed for Anxiety.     • pantoprazole (PROTONIX) 40 MG EC tablet   12   • potassium chloride (K-DUR) 10 MEQ CR tablet Take 10 mEq by mouth 2 (Two) Times a Day.         No Known  "Allergies    Social History     Socioeconomic History   • Marital status:      Spouse name: Not on file   • Number of children: Not on file   • Years of education: Not on file   • Highest education level: Not on file   Tobacco Use   • Smoking status: Current Every Day Smoker     Packs/day: 0.50     Years: 25.00     Pack years: 12.50     Types: Cigarettes   • Smokeless tobacco: Never Used   Substance and Sexual Activity   • Alcohol use: Yes     Comment: 6 beers daily   • Drug use: No   • Sexual activity: Defer       Family History   Problem Relation Age of Onset   • Colon cancer Neg Hx    • Colon polyps Neg Hx    • Esophageal cancer Neg Hx        Review of Systems   Constitutional: Negative for fatigue, fever and unexpected weight change.   HENT: Negative for hearing loss, sore throat and voice change.    Eyes: Negative for visual disturbance.   Respiratory: Negative for cough, shortness of breath and wheezing.    Cardiovascular: Negative for chest pain and palpitations.   Gastrointestinal: Negative for abdominal pain, blood in stool and vomiting.   Endocrine: Negative for polydipsia and polyuria.   Genitourinary: Negative for difficulty urinating, dysuria, hematuria and urgency.   Musculoskeletal: Negative for joint swelling and myalgias.   Skin: Negative for color change, rash and wound.   Neurological: Negative for dizziness, tremors, seizures and syncope.   Hematological: Does not bruise/bleed easily.   Psychiatric/Behavioral: Negative for agitation and confusion. The patient is not nervous/anxious.        Objective     Vitals:    10/29/19 0945   BP: 136/84   Pulse: 90   Temp: 98 °F (36.7 °C)   SpO2: 98%   Weight: 95.3 kg (210 lb)   Height: 188 cm (74\")     Body mass index is 26.96 kg/m².    Physical Exam   Constitutional: He is oriented to person, place, and time. He appears well-developed and well-nourished. He is cooperative.   HENT:   Head: Normocephalic and atraumatic.   Eyes: Conjunctivae are normal. " Pupils are equal, round, and reactive to light. No scleral icterus.   Neck: Normal range of motion. Neck supple. No JVD present. No thyroid mass and no thyromegaly present.   Cardiovascular: Normal rate, regular rhythm and normal heart sounds. Exam reveals no gallop and no friction rub.   No murmur heard.  Pulmonary/Chest: Effort normal and breath sounds normal. No accessory muscle usage. No respiratory distress. He has no wheezes. He has no rales.   Abdominal: Soft. Normal appearance and bowel sounds are normal. He exhibits no distension, no ascites and no mass. There is no hepatosplenomegaly. There is no tenderness. There is no rebound and no guarding.   Musculoskeletal: Normal range of motion. He exhibits no edema or tenderness.     Vascular Status -  His right foot exhibits normal foot vasculature  and no edema. His left foot exhibits normal foot vasculature  and no edema.  Lymphadenopathy:     He has no cervical adenopathy.   Neurological: He is alert and oriented to person, place, and time. He has normal strength. Gait normal.   Skin: Skin is warm, dry and intact. No rash noted.       Imaging Results (most recent)     None          Body mass index is 26.96 kg/m².    Assessment/Plan     Ezra was seen today for abdominal pain.    Diagnoses and all orders for this visit:    Cirrhosis of liver without ascites, unspecified hepatic cirrhosis type (CMS/HCC)  -     US Liver; Future  -     US Elastography Parenchyma; Future    Change in bowel habits  -     Case Request; Standing  -     Implement Anesthesia Orders Day of Procedure; Standing  -     Obtain Informed Consent; Standing  -     Case Request    Other orders  -     polyethylene glycol (GoLYTELY) 236 g solution; Take 3,785 mL by mouth 1 (One) Time for 1 dose. Take as directed        COLONOSCOPY WITH ANESTHESIA (N/A)    I will request labs from ER/PCP office as I do not have any to review, I will also request CT from ER   Strongly encouraged etoh cessation.   Explained etoh will continue to further damage his liver and ultimately result in death, he verbalized understanding.  Discussed etoh treatment center    If ultrasound shows patient to be F4 proceed with EGD evaluation for screening of varices    Further lab/beltran pending review of requested records    Explanation provided that all patients with liver disease should avoid narcotics, sedatives, due to potential to aggravate encephalopathy. Patient should also avoid ASA, NSAIDS or other medications that contain these products due to their potential to decrease plt adhesiveness, irritate the stomach, or reduce renal function.  Tylenol explained to be acceptable as long as use is limited to 2000 mg per day.    Patients with cirrhosis have an increased risk of development of hepatocellular cancer.  They will need to undergo yearly AFP, US. Patient will also be advised to undergo EGD evaluation every 1-2 years for variceal screening.      Diet for patient with cirrhosis explained as increase in protein to prevent muscle wasting. They should also limit Na to 2881-5176 mg per day.  Exercising will also prevent muscle wasting and improve muscle growth.  I have encouraged recording daily weights and contacting office with greater than 10 lb weight gain in one week.    Patient's Body mass index is 26.96 kg/m². BMI is above normal parameters. Recommendations include: no follow up.      There are no Patient Instructions on file for this visit.

## 2019-10-31 PROBLEM — R19.4 CHANGE IN BOWEL HABITS: Status: ACTIVE | Noted: 2019-10-31

## 2019-11-11 ENCOUNTER — TELEPHONE (OUTPATIENT)
Dept: GASTROENTEROLOGY | Facility: CLINIC | Age: 39
End: 2019-11-11

## 2019-11-11 ENCOUNTER — HOSPITAL ENCOUNTER (OUTPATIENT)
Dept: ULTRASOUND IMAGING | Facility: HOSPITAL | Age: 39
Discharge: HOME | End: 2019-11-11
Admitting: NURSE PRACTITIONER

## 2019-11-11 ENCOUNTER — HOSPITAL ENCOUNTER (OUTPATIENT)
Dept: ULTRASOUND IMAGING | Facility: HOSPITAL | Age: 39
Discharge: HOME | End: 2019-11-11

## 2019-11-11 DIAGNOSIS — K74.60 CIRRHOSIS OF LIVER WITHOUT ASCITES, UNSPECIFIED HEPATIC CIRRHOSIS TYPE (HCC): ICD-10-CM

## 2019-11-11 PROCEDURE — 76981 USE PARENCHYMA: CPT

## 2019-11-11 PROCEDURE — 76705 ECHO EXAM OF ABDOMEN: CPT

## 2019-11-11 NOTE — TELEPHONE ENCOUNTER
Attempted to call patient regarding result of elastiography    I would like to order blood work (fibrosure)    No answer, message left with return number  He can have blood work day of colonoscopy if he wishes

## 2019-11-25 ENCOUNTER — ANESTHESIA EVENT (OUTPATIENT)
Dept: GASTROENTEROLOGY | Facility: HOSPITAL | Age: 39
End: 2019-11-25

## 2019-11-25 ENCOUNTER — LAB (OUTPATIENT)
Dept: LAB | Facility: HOSPITAL | Age: 39
End: 2019-11-25

## 2019-11-25 ENCOUNTER — ANESTHESIA (OUTPATIENT)
Dept: GASTROENTEROLOGY | Facility: HOSPITAL | Age: 39
End: 2019-11-25

## 2019-11-25 ENCOUNTER — TELEPHONE (OUTPATIENT)
Dept: GASTROENTEROLOGY | Facility: CLINIC | Age: 39
End: 2019-11-25

## 2019-11-25 ENCOUNTER — HOSPITAL ENCOUNTER (OUTPATIENT)
Facility: HOSPITAL | Age: 39
Setting detail: HOSPITAL OUTPATIENT SURGERY
Discharge: HOME | End: 2019-11-25
Attending: INTERNAL MEDICINE | Admitting: INTERNAL MEDICINE

## 2019-11-25 VITALS
BODY MASS INDEX: 27.34 KG/M2 | HEIGHT: 74 IN | WEIGHT: 213 LBS | DIASTOLIC BLOOD PRESSURE: 118 MMHG | SYSTOLIC BLOOD PRESSURE: 153 MMHG | RESPIRATION RATE: 15 BRPM | TEMPERATURE: 97.4 F | HEART RATE: 73 BPM | OXYGEN SATURATION: 98 %

## 2019-11-25 DIAGNOSIS — K70.9 LIVER DISEASE DUE TO ALCOHOL (HCC): ICD-10-CM

## 2019-11-25 DIAGNOSIS — R19.4 CHANGE IN BOWEL HABITS: ICD-10-CM

## 2019-11-25 DIAGNOSIS — K70.9 LIVER DISEASE DUE TO ALCOHOL (HCC): Primary | ICD-10-CM

## 2019-11-25 LAB
ALBUMIN SERPL-MCNC: 4.8 G/DL (ref 3.5–5.2)
ALBUMIN/GLOB SERPL: 1.7 G/DL
ALP SERPL-CCNC: 97 U/L (ref 39–117)
ALT SERPL W P-5'-P-CCNC: 33 U/L (ref 1–41)
ANION GAP SERPL CALCULATED.3IONS-SCNC: 11.7 MMOL/L (ref 5–15)
AST SERPL-CCNC: 37 U/L (ref 1–40)
BILIRUB SERPL-MCNC: 0.6 MG/DL (ref 0.2–1.2)
BUN BLD-MCNC: 5 MG/DL (ref 6–20)
BUN/CREAT SERPL: 6.7 (ref 7–25)
CALCIUM SPEC-SCNC: 9.2 MG/DL (ref 8.6–10.5)
CHLORIDE SERPL-SCNC: 100 MMOL/L (ref 98–107)
CO2 SERPL-SCNC: 29.3 MMOL/L (ref 22–29)
CREAT BLD-MCNC: 0.75 MG/DL (ref 0.76–1.27)
GFR SERPL CREATININE-BSD FRML MDRD: 115 ML/MIN/1.73
GLOBULIN UR ELPH-MCNC: 2.8 GM/DL
GLUCOSE BLD-MCNC: 103 MG/DL (ref 65–99)
POTASSIUM BLD-SCNC: 3.6 MMOL/L (ref 3.5–5.2)
PROT SERPL-MCNC: 7.6 G/DL (ref 6–8.5)
SODIUM BLD-SCNC: 141 MMOL/L (ref 136–145)

## 2019-11-25 PROCEDURE — 25010000002 PROPOFOL 10 MG/ML EMULSION: Performed by: NURSE ANESTHETIST, CERTIFIED REGISTERED

## 2019-11-25 PROCEDURE — 88305 TISSUE EXAM BY PATHOLOGIST: CPT | Performed by: INTERNAL MEDICINE

## 2019-11-25 PROCEDURE — 45385 COLONOSCOPY W/LESION REMOVAL: CPT | Performed by: INTERNAL MEDICINE

## 2019-11-25 PROCEDURE — 80053 COMPREHEN METABOLIC PANEL: CPT | Performed by: INTERNAL MEDICINE

## 2019-11-25 PROCEDURE — 36415 COLL VENOUS BLD VENIPUNCTURE: CPT

## 2019-11-25 RX ORDER — AMLODIPINE BESYLATE 10 MG/1
10 TABLET ORAL DAILY
COMMUNITY

## 2019-11-25 RX ORDER — SODIUM CHLORIDE 9 MG/ML
500 INJECTION, SOLUTION INTRAVENOUS CONTINUOUS PRN
Status: DISCONTINUED | OUTPATIENT
Start: 2019-11-25 | End: 2019-11-25 | Stop reason: HOSPADM

## 2019-11-25 RX ORDER — SODIUM CHLORIDE 0.9 % (FLUSH) 0.9 %
10 SYRINGE (ML) INJECTION AS NEEDED
Status: DISCONTINUED | OUTPATIENT
Start: 2019-11-25 | End: 2019-11-25 | Stop reason: HOSPADM

## 2019-11-25 RX ORDER — SILDENAFIL 25 MG/1
25 TABLET, FILM COATED ORAL DAILY PRN
COMMUNITY

## 2019-11-25 RX ORDER — PROPOFOL 10 MG/ML
VIAL (ML) INTRAVENOUS AS NEEDED
Status: DISCONTINUED | OUTPATIENT
Start: 2019-11-25 | End: 2019-11-25 | Stop reason: SURG

## 2019-11-25 RX ADMIN — PROPOFOL 50 MG: 10 INJECTION, EMULSION INTRAVENOUS at 10:02

## 2019-11-25 RX ADMIN — LIDOCAINE HYDROCHLORIDE 100 MG: 20 INJECTION, SOLUTION INTRAVENOUS at 09:49

## 2019-11-25 RX ADMIN — PROPOFOL 50 MG: 10 INJECTION, EMULSION INTRAVENOUS at 09:57

## 2019-11-25 RX ADMIN — PROPOFOL 50 MG: 10 INJECTION, EMULSION INTRAVENOUS at 09:54

## 2019-11-25 RX ADMIN — SODIUM CHLORIDE 500 ML: 9 INJECTION, SOLUTION INTRAVENOUS at 09:35

## 2019-11-25 RX ADMIN — PROPOFOL 50 MG: 10 INJECTION, EMULSION INTRAVENOUS at 10:04

## 2019-11-25 RX ADMIN — PROPOFOL 50 MG: 10 INJECTION, EMULSION INTRAVENOUS at 10:06

## 2019-11-25 RX ADMIN — PROPOFOL 100 MG: 10 INJECTION, EMULSION INTRAVENOUS at 09:49

## 2019-11-25 RX ADMIN — PROPOFOL 100 MG: 10 INJECTION, EMULSION INTRAVENOUS at 09:52

## 2019-11-25 RX ADMIN — PROPOFOL 50 MG: 10 INJECTION, EMULSION INTRAVENOUS at 09:59

## 2019-11-25 NOTE — ANESTHESIA PREPROCEDURE EVALUATION
Anesthesia Evaluation     Patient summary reviewed   no history of anesthetic complications:  NPO Solid Status: > 8 hours  NPO Liquid Status: > 2 hours           Airway   Mallampati: II  TM distance: >3 FB  Dental    (+) upper dentures    Pulmonary    (+) a smoker Current Smoked day of surgery,   Cardiovascular   Exercise tolerance: (Limited by mobility, denies chest pain)    (+) hypertension poorly controlled,   (-) past MI, CAD, cardiac stents      Neuro/Psych- negative ROS  GI/Hepatic/Renal/Endo    (-) liver disease, no renal disease, diabetes    Musculoskeletal     Abdominal    Substance History      OB/GYN          Other                        Anesthesia Plan    ASA 3     MAC     intravenous induction     Anesthetic plan, all risks, benefits, and alternatives have been provided, discussed and informed consent has been obtained with: patient.

## 2019-11-25 NOTE — ANESTHESIA POSTPROCEDURE EVALUATION
Patient: Ezra King IV    Procedure Summary     Date:  11/25/19 Room / Location:  [facility] ENDOSCOPY 5 / [facility]    Anesthesia Start:  0948 Anesthesia Stop:  1007    Procedure:  COLONOSCOPY WITH ANESTHESIA (N/A ) Diagnosis:       Change in bowel habits      (Change in bowel habits [R19.4])    Surgeon:  Miller Zabala DO Provider:  COOKIE Lucio CRNA    Anesthesia Type:  MAC ASA Status:  3          Anesthesia Type: MAC  Last vitals  BP   158/100 (11/25/19 0909)   Temp   97.4 °F (36.3 °C) (11/25/19 0909)   Pulse   87 (11/25/19 0909)   Resp   18 (11/25/19 0909)     SpO2   100 % (11/25/19 0909)     Post Anesthesia Care and Evaluation    Patient location during evaluation: PACU  Patient participation: complete - patient participated  Level of consciousness: awake and alert  Pain score: 0  Pain management: adequate  Airway patency: patent  Anesthetic complications: No anesthetic complications    Cardiovascular status: acceptable and stable  Respiratory status: acceptable and unassisted  Hydration status: acceptable

## 2019-11-26 LAB
CYTO UR: NORMAL
LAB AP CASE REPORT: NORMAL
PATH REPORT.FINAL DX SPEC: NORMAL
PATH REPORT.GROSS SPEC: NORMAL

## 2019-12-10 ENCOUNTER — TELEPHONE (OUTPATIENT)
Dept: GASTROENTEROLOGY | Facility: CLINIC | Age: 39
End: 2019-12-10

## 2019-12-11 NOTE — TELEPHONE ENCOUNTER
"Spoke with patient  - He wants to know do I have cirrhosis or do I have fatty liver? How do I have fatty liver? I can not loose anymore weight. Can we not do a BX of my liver?\"      "

## 2019-12-16 NOTE — TELEPHONE ENCOUNTER
I left a message for him just now  Told him to call me back b/t 1-3:30 this pm to discuss his case

## 2020-09-18 ENCOUNTER — TELEPHONE (OUTPATIENT)
Dept: GASTROENTEROLOGY | Facility: CLINIC | Age: 40
End: 2020-09-18

## 2020-09-18 NOTE — TELEPHONE ENCOUNTER
Patient called today to schedule his 6 month colon recall.     Schedule for 10/01/2020 at 10:45am     Need case request.     [facility]     Patient agreed to do covid testing.

## 2020-09-21 ENCOUNTER — OFFICE VISIT (OUTPATIENT)
Dept: VASCULAR SURGERY | Facility: CLINIC | Age: 40
End: 2020-09-21

## 2020-09-21 ENCOUNTER — TELEPHONE (OUTPATIENT)
Dept: VASCULAR SURGERY | Facility: CLINIC | Age: 40
End: 2020-09-21

## 2020-09-21 VITALS
WEIGHT: 216 LBS | HEIGHT: 74 IN | SYSTOLIC BLOOD PRESSURE: 124 MMHG | BODY MASS INDEX: 27.72 KG/M2 | DIASTOLIC BLOOD PRESSURE: 86 MMHG | HEART RATE: 63 BPM | OXYGEN SATURATION: 100 %

## 2020-09-21 DIAGNOSIS — F17.210 CIGARETTE SMOKER: ICD-10-CM

## 2020-09-21 DIAGNOSIS — I87.323 CHRONIC VENOUS HYPERTENSION WITH INFLAMMATION INVOLVING BOTH SIDES: Primary | ICD-10-CM

## 2020-09-21 PROCEDURE — 99214 OFFICE O/P EST MOD 30 MIN: CPT | Performed by: NURSE PRACTITIONER

## 2020-09-21 RX ORDER — CYCLOBENZAPRINE HCL 10 MG
10 TABLET ORAL 3 TIMES DAILY PRN
COMMUNITY

## 2020-09-21 NOTE — PROGRESS NOTES
9/29/2020       Farhat Guillory MD  [address]  [place] [address]    Ezra King IV  1980    Chief Complaint   Patient presents with   • Follow-up     Follow Up For Varicose Veins the Arch Blew Out. Patient denies any stroke like symptoms.    • other     Patient states Right Foot Varicose Vein actually ruptured and lost about 3 pints of Blood but also having troubel with Left Foot as well.    • Smoker/Non-Smoker     Patient is Current Everyday Smoker   • Med Management     Verbally verified medication with patient/wife. Mr Fernando/Wife verbalized all medications are correct and up to date. Mr Fernando did verbalize he does not take 81 mg Apsirin.        Dear Farhat Guillory MD       HPI  I had the pleasure of seeing your patient Ezra King IV in the office today.  As you recall, Ezra King IV is a 40 y.o.  male who you are currently following for routine health maintenance.  He states he has had significant varicosities in both lower extremities for the past couple years.  He even started getting varicosities in his early teenage years.  He denies any history of DVT.  He reports recent right varicosity rupturing and lost 3 pints of blood.  He reports previously wearing compression stockings however has not been recently.  It was recommended he undergo radiofrequency ablation previously however this was a year and a half ago and he has not been back.       Review of Systems   Constitutional: Negative.    HENT: Negative.    Eyes: Negative.    Respiratory: Negative.    Cardiovascular: Positive for leg swelling.        Varicosities to bilateral lower extremities   Gastrointestinal: Negative.    Endocrine: Negative.    Genitourinary: Negative.    Musculoskeletal: Negative.    Skin: Negative.    Allergic/Immunologic: Negative.    Neurological: Negative.    Hematological: Negative.    Psychiatric/Behavioral: Negative.    All other systems reviewed and are negative.      /86 (BP Location: Left  "arm, Patient Position: Sitting, Cuff Size: Adult)   Pulse 63   Ht 188 cm (74\")   Wt 98 kg (216 lb)   SpO2 100%   BMI 27.73 kg/m²   Physical Exam  Constitutional:       General: He is not in acute distress.     Appearance: Normal appearance. He is well-developed. He is not diaphoretic.   HENT:      Head: Normocephalic and atraumatic.   Eyes:      Pupils: Pupils are equal, round, and reactive to light.   Neck:      Musculoskeletal: Normal range of motion and neck supple.      Vascular: No carotid bruit or JVD.   Cardiovascular:      Rate and Rhythm: Normal rate and regular rhythm.      Pulses: Normal pulses.           Femoral pulses are 2+ on the right side and 2+ on the left side.       Popliteal pulses are 2+ on the right side and 2+ on the left side.        Dorsalis pedis pulses are 2+ on the right side and 2+ on the left side.        Posterior tibial pulses are 2+ on the right side and 2+ on the left side.      Heart sounds: Normal heart sounds, S1 normal and S2 normal. No murmur. No friction rub. No gallop.       Comments: Significant varicosities to BLE with swelling, new veins to chest wall beginning.  Venous stasis to BLE  Pulmonary:      Effort: Pulmonary effort is normal.      Breath sounds: Normal breath sounds.   Abdominal:      General: Bowel sounds are normal. There is no abdominal bruit.      Palpations: Abdomen is soft.      Tenderness: There is no abdominal tenderness.   Musculoskeletal: Normal range of motion.      Right lower leg: Edema present.      Left lower leg: Edema present.   Skin:     General: Skin is warm and dry.   Neurological:      Mental Status: He is alert and oriented to person, place, and time.      Cranial Nerves: No cranial nerve deficit.   Psychiatric:         Behavior: Behavior normal.         Thought Content: Thought content normal.         Judgment: Judgment normal.       Patient Active Problem List   Diagnosis   • Cigarette smoker   • Obesity (BMI 30-39.9)   • Numbness " and tingling of both lower extremities   • Numbness and tingling of both upper extremities   • Change in bowel habits   • Hypertension         ICD-10-CM ICD-9-CM   1. Chronic venous hypertension with inflammation involving both sides  I87.323 459.32   2. Cigarette smoker  F17.210 305.1         Plan: After thoroughly evaluating Ezra King IV, I believe the best course of action is to proceed with a venous valvular insufficiency study that has been over a year since his last test.  He does have significant venous insufficiency to bilateral lower extremities which was evidenced on previous testing.  I would recommend he continue wearing compression stockings on a daily basis.  I did explain what to do if the varicosities ruptured again.  I instructed him to hold direct pressure and elevate his extremity.  This should resolve within about 10 to 15 minutes.  I also instructed to keep his legs well moisturized and elevated when he is not on them.  I did  extensively on smoking cessation, and the patient was advised of the continued risks of smoking.  I provided over 3  minutes counseling on this matter.  He is not interested in smoking.  The patient can continue taking their current medication regimen as previously planned.  This was all discussed in full with complete understanding.    Thank you for allowing me to participate in the care of your patient.  Please do not hesitate with any questions or concerns.  I will keep you aware of any further encounters with Ezra King IV.        Sincerely yours,         OLIVER Alonzo

## 2020-09-23 ENCOUNTER — PREP FOR SURGERY (OUTPATIENT)
Dept: OTHER | Facility: HOSPITAL | Age: 40
End: 2020-09-23

## 2020-09-23 DIAGNOSIS — R19.4 CHANGE IN BOWEL HABITS: Primary | ICD-10-CM

## 2020-09-24 ENCOUNTER — TRANSCRIBE ORDERS (OUTPATIENT)
Dept: ADMINISTRATIVE | Facility: HOSPITAL | Age: 40
End: 2020-09-24

## 2020-09-24 DIAGNOSIS — Z01.818 PRE-OP TESTING: Primary | ICD-10-CM

## 2020-09-28 ENCOUNTER — LAB (OUTPATIENT)
Dept: LAB | Facility: HOSPITAL | Age: 40
End: 2020-09-28

## 2020-09-28 PROCEDURE — U0003 INFECTIOUS AGENT DETECTION BY NUCLEIC ACID (DNA OR RNA); SEVERE ACUTE RESPIRATORY SYNDROME CORONAVIRUS 2 (SARS-COV-2) (CORONAVIRUS DISEASE [COVID-19]), AMPLIFIED PROBE TECHNIQUE, MAKING USE OF HIGH THROUGHPUT TECHNOLOGIES AS DESCRIBED BY CMS-2020-01-R: HCPCS | Performed by: INTERNAL MEDICINE

## 2020-09-28 PROCEDURE — C9803 HOPD COVID-19 SPEC COLLECT: HCPCS | Performed by: INTERNAL MEDICINE

## 2020-09-29 LAB
COVID LABCORP PRIORITY: NORMAL
SARS-COV-2 RNA RESP QL NAA+PROBE: NOT DETECTED

## 2020-10-01 ENCOUNTER — ANESTHESIA EVENT (OUTPATIENT)
Dept: GASTROENTEROLOGY | Facility: HOSPITAL | Age: 40
End: 2020-10-01

## 2020-10-01 ENCOUNTER — TELEPHONE (OUTPATIENT)
Dept: GASTROENTEROLOGY | Facility: CLINIC | Age: 40
End: 2020-10-01

## 2020-10-01 ENCOUNTER — ANESTHESIA (OUTPATIENT)
Dept: GASTROENTEROLOGY | Facility: HOSPITAL | Age: 40
End: 2020-10-01

## 2020-10-01 ENCOUNTER — HOSPITAL ENCOUNTER (OUTPATIENT)
Facility: HOSPITAL | Age: 40
Setting detail: HOSPITAL OUTPATIENT SURGERY
Discharge: HOME | End: 2020-10-01
Attending: INTERNAL MEDICINE | Admitting: INTERNAL MEDICINE

## 2020-10-01 VITALS
BODY MASS INDEX: 27.98 KG/M2 | SYSTOLIC BLOOD PRESSURE: 140 MMHG | RESPIRATION RATE: 15 BRPM | HEIGHT: 74 IN | TEMPERATURE: 97.8 F | DIASTOLIC BLOOD PRESSURE: 98 MMHG | WEIGHT: 218 LBS | HEART RATE: 86 BPM | OXYGEN SATURATION: 99 %

## 2020-10-01 DIAGNOSIS — R19.4 CHANGE IN BOWEL HABITS: ICD-10-CM

## 2020-10-01 PROCEDURE — 25010000002 PROPOFOL 10 MG/ML EMULSION: Performed by: NURSE ANESTHETIST, CERTIFIED REGISTERED

## 2020-10-01 PROCEDURE — 45385 COLONOSCOPY W/LESION REMOVAL: CPT | Performed by: INTERNAL MEDICINE

## 2020-10-01 PROCEDURE — 88305 TISSUE EXAM BY PATHOLOGIST: CPT | Performed by: INTERNAL MEDICINE

## 2020-10-01 RX ORDER — SODIUM CHLORIDE 0.9 % (FLUSH) 0.9 %
10 SYRINGE (ML) INJECTION AS NEEDED
Status: DISCONTINUED | OUTPATIENT
Start: 2020-10-01 | End: 2020-10-01 | Stop reason: HOSPADM

## 2020-10-01 RX ORDER — PROPOFOL 10 MG/ML
VIAL (ML) INTRAVENOUS AS NEEDED
Status: DISCONTINUED | OUTPATIENT
Start: 2020-10-01 | End: 2020-10-01 | Stop reason: SURG

## 2020-10-01 RX ORDER — SODIUM CHLORIDE 9 MG/ML
500 INJECTION, SOLUTION INTRAVENOUS CONTINUOUS PRN
Status: DISCONTINUED | OUTPATIENT
Start: 2020-10-01 | End: 2020-10-01 | Stop reason: HOSPADM

## 2020-10-01 RX ADMIN — PROPOFOL 100 MG: 10 INJECTION, EMULSION INTRAVENOUS at 12:31

## 2020-10-01 RX ADMIN — PROPOFOL 100 MG: 10 INJECTION, EMULSION INTRAVENOUS at 12:30

## 2020-10-01 RX ADMIN — PROPOFOL 50 MG: 10 INJECTION, EMULSION INTRAVENOUS at 12:34

## 2020-10-01 RX ADMIN — SODIUM CHLORIDE 500 ML: 9 INJECTION, SOLUTION INTRAVENOUS at 12:07

## 2020-10-01 RX ADMIN — PROPOFOL 50 MG: 10 INJECTION, EMULSION INTRAVENOUS at 12:38

## 2020-10-01 RX ADMIN — PROPOFOL 50 MG: 10 INJECTION, EMULSION INTRAVENOUS at 12:33

## 2020-10-01 RX ADMIN — LIDOCAINE HYDROCHLORIDE 120 MG: 20 INJECTION, SOLUTION INTRAVENOUS at 12:28

## 2020-10-01 RX ADMIN — PROPOFOL 100 MG: 10 INJECTION, EMULSION INTRAVENOUS at 12:29

## 2020-10-01 RX ADMIN — PROPOFOL 100 MG: 10 INJECTION, EMULSION INTRAVENOUS at 12:28

## 2020-10-01 RX ADMIN — PROPOFOL 100 MG: 10 INJECTION, EMULSION INTRAVENOUS at 12:32

## 2020-10-01 NOTE — ANESTHESIA POSTPROCEDURE EVALUATION
Patient: Ezra King IV    Procedure Summary     Date: 10/01/20 Room / Location: [facility] ENDOSCOPY 6 /  [facility]    Anesthesia Start: 1223 Anesthesia Stop: 1243    Procedure: COLONOSCOPY WITH ANESTHESIA (N/A ) Diagnosis:       Change in bowel habits      (Change in bowel habits [R19.4])    Surgeon: Miller Zabala DO Provider: Aleksandr Mares CRNA    Anesthesia Type: MAC ASA Status: 2          Anesthesia Type: MAC    Vitals  Vitals Value Taken Time   BP     Temp     Pulse 79 10/01/20 1243   Resp     SpO2 100 % 10/01/20 1243   Vitals shown include unvalidated device data.        Post Anesthesia Care and Evaluation    Patient location during evaluation: PHASE II  Patient participation: complete - patient participated  Level of consciousness: awake  Pain score: 0  Pain management: adequate  Airway patency: patent  Anesthetic complications: No anesthetic complications  PONV Status: none  Cardiovascular status: acceptable  Respiratory status: acceptable  Hydration status: acceptable

## 2020-10-01 NOTE — H&P
[facility] Gastroenterology  Pre Procedure History & Physical    Chief Complaint:   Polyps    Subjective     HPI:   Polyps    Past Medical History:   Past Medical History:   Diagnosis Date   • Arthritis    • Rasmussen esophagus    • Fatty liver    • Hypertension    • Hypothyroid    • Irritable bowel    • Neuropathy        Past Surgical History:  Past Surgical History:   Procedure Laterality Date   • COLONOSCOPY N/A 11/25/2019    Procedure: COLONOSCOPY WITH ANESTHESIA;  Surgeon: Miller Zabala DO;  Location: [facility] ENDOSCOPY;  Service: Gastroenterology   • ENDOSCOPY     • EPIDURAL BLOCK     • KNEE SURGERY Left     x2   • KNEE SURGERY Right     x2   • MOUTH SURGERY     • VASECTOMY         Family History:  Family History   Problem Relation Age of Onset   • Diabetes Father    • Diabetes Paternal Grandmother    • Colon cancer Neg Hx    • Colon polyps Neg Hx    • Esophageal cancer Neg Hx        Social History:   reports that he has been smoking cigarettes. He has a 12.50 pack-year smoking history. He has never used smokeless tobacco. He reports current alcohol use. He reports that he does not use drugs.    Medications:   Prior to Admission medications    Medication Sig Start Date End Date Taking? Authorizing Provider   amLODIPine (NORVASC) 10 MG tablet Take 10 mg by mouth Daily.   Yes Adriana Davis MD   cyclobenzaprine (FLEXERIL) 10 MG tablet Take 10 mg by mouth 3 (Three) Times a Day As Needed for Muscle Spasms.   Yes Adriana Davis MD   pantoprazole (PROTONIX) 40 MG EC tablet  4/18/19  Yes Adriana Davis MD   potassium chloride (K-DUR) 10 MEQ CR tablet Take 10 mEq by mouth 2 (Two) Times a Day.   Yes Adriana Davis MD   sildenafil (VIAGRA) 25 MG tablet Take 25 mg by mouth Daily As Needed for Erectile Dysfunction.   Yes ProviderAdriana MD       Allergies:  Patient has no known allergies.    ROS:    General: Weight stable  Resp: No SOA  Cardiovascular: No CP    Objective     Blood  "pressure 140/97, pulse 86, temperature 97.8 °F (36.6 °C), temperature source Temporal, resp. rate 20, height 188 cm (74\"), weight 98.9 kg (218 lb), SpO2 99 %.    Physical Exam   Constitutional: Pt is oriented to person, place, and in no distress.   HENT: Mouth/Throat: Oropharynx is clear.   Cardiovascular: Normal rate, regular rhythm.    Pulmonary/Chest: Effort normal. No respiratory distress. No  wheezes.   Abdominal: Soft. Non-distended.  Skin: Skin is warm and dry.   Psychiatric: Mood, memory, affect and judgment appear normal.     Assessment/Plan     Diagnosis:  Polyps    Anticipated Surgical Procedure:  C-scope    The risks, benefits, and alternatives of this procedure have been discussed with the patient or the responsible party- the patient understands and agrees to proceed.        "

## 2020-10-01 NOTE — ANESTHESIA PREPROCEDURE EVALUATION
Anesthesia Evaluation     no history of anesthetic complications:  NPO Solid Status: > 8 hours  NPO Liquid Status: > 2 hours           Airway   Mallampati: I  TM distance: >3 FB  Neck ROM: full  No difficulty expected  Dental    (+) upper dentures    Pulmonary    (+) a smoker Current,   (-) sleep apnea  Cardiovascular   Exercise tolerance: good (4-7 METS)    (+) hypertension,   (-) CAD      Neuro/Psych  (+) numbness (neuropathy bilateral upper extremities),     GI/Hepatic/Renal/Endo    (+)   liver disease fatty liver disease,   (-) no renal disease, diabetes    Musculoskeletal     Abdominal    Substance History      OB/GYN          Other   arthritis,                      Anesthesia Plan    ASA 2     MAC     intravenous induction     Anesthetic plan, all risks, benefits, and alternatives have been provided, discussed and informed consent has been obtained with: patient.

## 2020-10-02 LAB
LAB AP CASE REPORT: NORMAL
PATH REPORT.FINAL DX SPEC: NORMAL
PATH REPORT.GROSS SPEC: NORMAL

## 2020-10-15 ENCOUNTER — TELEPHONE (OUTPATIENT)
Dept: VASCULAR SURGERY | Facility: CLINIC | Age: 40
End: 2020-10-15

## 2020-10-15 NOTE — TELEPHONE ENCOUNTER
Spoke with Mr King reminding him of his appointments for Friday, October 16th, 2020. [facility] for 1 o'clock testing and follow up afterwards at 245 pm with Lisa BOYD. Mr King confirmed he would be here.

## 2020-10-16 ENCOUNTER — HOSPITAL ENCOUNTER (OUTPATIENT)
Dept: ULTRASOUND IMAGING | Facility: HOSPITAL | Age: 40
Discharge: HOME | End: 2020-10-16
Admitting: NURSE PRACTITIONER

## 2020-10-16 ENCOUNTER — OFFICE VISIT (OUTPATIENT)
Dept: VASCULAR SURGERY | Facility: CLINIC | Age: 40
End: 2020-10-16

## 2020-10-16 VITALS
OXYGEN SATURATION: 99 % | SYSTOLIC BLOOD PRESSURE: 128 MMHG | HEIGHT: 74 IN | WEIGHT: 217 LBS | BODY MASS INDEX: 27.85 KG/M2 | HEART RATE: 88 BPM | DIASTOLIC BLOOD PRESSURE: 82 MMHG

## 2020-10-16 DIAGNOSIS — I87.323 CHRONIC VENOUS HYPERTENSION WITH INFLAMMATION INVOLVING BOTH SIDES: Primary | ICD-10-CM

## 2020-10-16 DIAGNOSIS — I87.323 CHRONIC VENOUS HYPERTENSION WITH INFLAMMATION INVOLVING BOTH SIDES: ICD-10-CM

## 2020-10-16 PROCEDURE — 93970 EXTREMITY STUDY: CPT | Performed by: SURGERY

## 2020-10-16 PROCEDURE — 93970 EXTREMITY STUDY: CPT

## 2020-10-16 PROCEDURE — 99214 OFFICE O/P EST MOD 30 MIN: CPT | Performed by: NURSE PRACTITIONER

## 2020-10-16 NOTE — PROGRESS NOTES
"10/20/2020       Farhat Guillory MD  [address]  [place] [address]    Ezra King IV  1980    Chief Complaint   Patient presents with   • Follow-up     2 Week Follow Up For Chronic venous hypertension with inflammation involving both sides. Test [id number] US pad venous lower ext bilat. Patient denies any stroke like symptoms.    • Smoker/Non-Smoker     Patient is Current Everyday Smoker.    • Med Management     Verbally verified medications with patient/wife. Mr Fernando/Wife verbalized all medications are correct and up to date.        Dear Farhat Guillory MD       HPI  I had the pleasure of seeing your patient Ezra King IV in the office today.  As you recall, Ezra King IV is a 40 y.o.  male who you are currently following for routine health maintenance.  He states he has had significant varicosities in both lower extremities for the past couple years.  He even started getting varicosities in his early teenage years.  He denies any history of DVT.  He reports recent right varicosity rupturing and lost 3 pints of blood.  He reports previously wearing compression stockings however has not been recently.  He did have noninvasive testing performed today, which I did review in office.      Review of Systems   Constitutional: Negative.    HENT: Negative.    Eyes: Negative.    Respiratory: Negative.    Cardiovascular: Positive for leg swelling.        Varicosities to bilateral lower extremities   Gastrointestinal: Negative.    Endocrine: Negative.    Genitourinary: Negative.    Musculoskeletal: Negative.    Skin: Negative.    Allergic/Immunologic: Negative.    Neurological: Negative.    Hematological: Negative.    Psychiatric/Behavioral: Negative.    All other systems reviewed and are negative.      /82 (BP Location: Left arm, Patient Position: Sitting, Cuff Size: Adult)   Pulse 88   Ht 188 cm (74\")   Wt 98.4 kg (217 lb)   SpO2 99%   BMI 27.86 kg/m²   Physical Exam  Vitals signs and " nursing note reviewed.   Constitutional:       General: He is not in acute distress.     Appearance: Normal appearance. He is well-developed. He is not diaphoretic.   HENT:      Head: Normocephalic and atraumatic.   Eyes:      Pupils: Pupils are equal, round, and reactive to light.   Neck:      Musculoskeletal: Normal range of motion and neck supple.      Vascular: No carotid bruit or JVD.   Cardiovascular:      Rate and Rhythm: Normal rate and regular rhythm.      Pulses: Normal pulses.      Heart sounds: Normal heart sounds, S1 normal and S2 normal. No murmur. No friction rub. No gallop.       Comments: Significant varicosities to BLE with swelling, new veins to chest wall beginning.  Venous stasis to BLE  Pulmonary:      Effort: Pulmonary effort is normal.      Breath sounds: Normal breath sounds.   Abdominal:      General: Bowel sounds are normal. There is no abdominal bruit.      Palpations: Abdomen is soft.      Tenderness: There is no abdominal tenderness.   Musculoskeletal: Normal range of motion.      Right lower leg: Edema present.      Left lower leg: Edema present.   Skin:     General: Skin is warm and dry.   Neurological:      General: No focal deficit present.      Mental Status: He is alert and oriented to person, place, and time.      Cranial Nerves: No cranial nerve deficit.   Psychiatric:         Mood and Affect: Mood normal.         Behavior: Behavior normal.         Thought Content: Thought content normal.         Judgment: Judgment normal.         Us Venous Doppler Lower Extremity Bilateral (duplex)    Result Date: 10/16/2020  Narrative: History: Swelling   Comments: Venous valvular insufficiency testing was performed in the bilateral lower extremities using duplex ultrasound with compression techniques.  The common femoral vein, superficial femoral, popliteal vein, posterior tibial vein, peroneal vein, greater saphenous vein, and lesser saphenous veins were interrogated.  On the right, the  greater saphenous vein at the junction measured 6.4mm. In the mid thigh measured 4.7mm. Above the knee measured 2.3mm. In the mid calf measured 5.4mm. At the ankle measured 4mm. There is greater than 0.5 seconds of reflux from the junction to the ankle. The lesser saphenous vein has greater than 0.5 seconds of reflux in the mid calf. There is no evidence of DVT. There is a  at the level of the knee measuring 4.3 mm with greater than 0.5 seconds of reflux. There are varicose veins from the mid thigh to the ankle. There is also deep venous reflux in the popliteal vein greater than 0.5 seconds.  On the left, the greater saphenous vein at the junction measured 6.4mm. In the mid thigh measured 2.7mm. Above the knee measured 2.5mm. In the mid calf measured 3.7mm. At the ankle measured 3.5mm. There is grade and 0.5 seconds of reflux from the junction to the ankle.The lesser saphenous vein has greater than 0.5 seconds of reflux at the knee only. There is no evidence of DVT. There is deep venous reflux in the popliteal vein greater than 0.5 seconds.      Impression: Impression: There is evidence of significant venous insufficiency in both lower extremity greater saphenous veins and in both lower extremity lesser saphenous veins. There is a  in the right lower extremity with significant reflux. There is also deep venous reflux in both lower extremities in the popliteal vein.   This report was finalized on 10/16/2020 14:48 by Dr. Angel Varner MD.         Patient Active Problem List   Diagnosis   • Cigarette smoker   • Obesity (BMI 30-39.9)   • Numbness and tingling of both lower extremities   • Numbness and tingling of both upper extremities   • Change in bowel habits   • Hypertension         ICD-10-CM ICD-9-CM   1. Chronic venous hypertension with inflammation involving both sides  I87.323 459.32         Plan: After thoroughly evaluating Ezra King IV, I believe the best course of action is to proceed  with endovenous closure of the right lower extremity greater saphenous vein using Venaseal.  I did review his testing and he does have significant venous insufficiency noted to bilateral lower extremities.  He also has greater than 3 seconds of reflux mid calf as well as a  with greater than 3 seconds of reflux.  I do believe  Venaseal would be the best option given this.  Risks of saphenous vein closure include, but are not limited to, bleeding, infection, vessel damage, sensitivity reaction, DVT, phlebitis, and pulmonary embolus.  The patient understands these risks and wishes to proceed with procedure.  I would recommend he continue wearing compression stockings on a daily basis.  I did explain what to do if the varicosities ruptured again.  I instructed him to hold direct pressure and elevate his extremity.  This should resolve within about 10 to 15 minutes.  I also instructed to keep his legs well moisturized and elevated when he is not on them.  I did  extensively on smoking cessation, and the patient was advised of the continued risks of smoking.  I provided over 3  minutes counseling on this matter.  He is not interested in smoking.  The patient can continue taking their current medication regimen as previously planned.  This was all discussed in full with complete understanding.    Thank you for allowing me to participate in the care of your patient.  Please do not hesitate with any questions or concerns.  I will keep you aware of any further encounters with Ezra King IV.        Sincerely yours,         OLIVER Alonzo

## 2020-10-26 ENCOUNTER — TELEPHONE (OUTPATIENT)
Dept: VASCULAR SURGERY | Facility: CLINIC | Age: 40
End: 2020-10-26

## 2020-10-26 PROBLEM — I87.323 CHRONIC VENOUS HYPERTENSION WITH INFLAMMATION INVOLVING BOTH SIDES: Status: ACTIVE | Noted: 2020-10-26

## 2020-10-26 NOTE — TELEPHONE ENCOUNTER
Spoke with patient and advised of upcoming procedure.  Patient pre work is scheduled for 10/30/2020 at 145 pm.  Patient procedure is scheduled for 11/2/2020 at 600 am.  Patient advised of Covid 19 test, masking and visitation.   Patient advised of location time and prep.  Patient expressed understanding for all that was discussed.

## 2020-10-28 ENCOUNTER — TRANSCRIBE ORDERS (OUTPATIENT)
Dept: ADMINISTRATIVE | Facility: HOSPITAL | Age: 40
End: 2020-10-28

## 2020-10-28 DIAGNOSIS — Z01.818 PREOP TESTING: Primary | ICD-10-CM

## 2020-10-29 ENCOUNTER — TELEPHONE (OUTPATIENT)
Dept: VASCULAR SURGERY | Facility: CLINIC | Age: 40
End: 2020-10-29

## 2020-10-30 ENCOUNTER — APPOINTMENT (OUTPATIENT)
Dept: PREADMISSION TESTING | Facility: HOSPITAL | Age: 40
End: 2020-10-30

## 2021-03-25 ENCOUNTER — TELEPHONE (OUTPATIENT)
Dept: VASCULAR SURGERY | Facility: CLINIC | Age: 41
End: 2021-03-25

## 2021-04-14 ENCOUNTER — OFFICE VISIT (OUTPATIENT)
Dept: VASCULAR SURGERY | Facility: CLINIC | Age: 41
End: 2021-04-14

## 2021-04-14 VITALS
DIASTOLIC BLOOD PRESSURE: 82 MMHG | OXYGEN SATURATION: 99 % | HEART RATE: 89 BPM | SYSTOLIC BLOOD PRESSURE: 122 MMHG | HEIGHT: 74 IN | BODY MASS INDEX: 26.56 KG/M2 | WEIGHT: 207 LBS

## 2021-04-14 DIAGNOSIS — I10 ESSENTIAL HYPERTENSION: ICD-10-CM

## 2021-04-14 DIAGNOSIS — F17.210 CIGARETTE SMOKER: ICD-10-CM

## 2021-04-14 DIAGNOSIS — Z01.818 PREOP TESTING: ICD-10-CM

## 2021-04-14 DIAGNOSIS — Z79.02 ENCOUNTER FOR MONITORING ANTIPLATELET THERAPY: ICD-10-CM

## 2021-04-14 DIAGNOSIS — I87.2 VENOUS INSUFFICIENCY (CHRONIC) (PERIPHERAL): ICD-10-CM

## 2021-04-14 DIAGNOSIS — I87.323 CHRONIC VENOUS HYPERTENSION WITH INFLAMMATION INVOLVING BOTH SIDES: Primary | ICD-10-CM

## 2021-04-14 DIAGNOSIS — Z51.81 ENCOUNTER FOR MONITORING ANTIPLATELET THERAPY: ICD-10-CM

## 2021-04-14 PROCEDURE — 99214 OFFICE O/P EST MOD 30 MIN: CPT | Performed by: NURSE PRACTITIONER

## 2021-04-16 NOTE — PROGRESS NOTES
"04/14/2021       Farhat Guillory MD  [address]  [place] [address]    Ezra King IV  1980    Chief Complaint   Patient presents with   • Follow-up     Patient here to Discuss Surgery. Patient denies any stroke like symptoms.    • Smoker     Patient is a Current Everyday Smoker    • Med Management     Verbally verified medications with patient        Dear Farhat Guillory MD       HPI  I had the pleasure of seeing your patient Ezra King IV in the office today.  As you recall, Ezra King IV is a 40 y.o.  male who you are currently following for routine health maintenance.  He states he has had significant varicosities in both lower extremities for the past couple years.  He even started getting varicosities in his early teenage years.  He denies any history of DVT.  He does report having more swelling and discomfort to his legs.  He also had another rupture of varicosity.  He was scheduled for endovenous closure however due to transportation issues this was canceled and then pandemic canceled this as well at one time.  He is back to set this back up.  Unfortunately, he does smoke half a pack of cigarettes per day.  He does have varicosities across to his chest wall but CTV was checked with no evidence of occlusions.    Review of Systems   Constitutional: Negative.    HENT: Negative.    Eyes: Negative.    Respiratory: Negative.    Cardiovascular: Positive for leg swelling.        Leg cramping, varicosities to bilateral lower extremities   Gastrointestinal: Negative.    Endocrine: Negative.    Genitourinary: Negative.    Musculoskeletal: Negative.    Skin: Negative.    Allergic/Immunologic: Negative.    Neurological: Negative.    Hematological: Negative.    Psychiatric/Behavioral: Negative.         /82 (BP Location: Left arm, Patient Position: Sitting, Cuff Size: Adult)   Pulse 89   Ht 188 cm (74\")   Wt 93.9 kg (207 lb)   SpO2 99%   BMI 26.58 kg/m²   Physical Exam  Vitals and " nursing note reviewed.   Constitutional:       General: He is not in acute distress.     Appearance: Normal appearance. He is well-developed. He is not diaphoretic.   HENT:      Head: Normocephalic and atraumatic.   Neck:      Vascular: No carotid bruit or JVD.   Cardiovascular:      Rate and Rhythm: Normal rate and regular rhythm.      Pulses: Normal pulses.           Femoral pulses are 2+ on the right side and 2+ on the left side.       Popliteal pulses are 2+ on the right side and 2+ on the left side.        Dorsalis pedis pulses are 2+ on the right side and 2+ on the left side.        Posterior tibial pulses are 2+ on the right side and 2+ on the left side.      Heart sounds: Normal heart sounds, S1 normal and S2 normal. No murmur heard.   No friction rub. No gallop.       Comments: Significant varicosities to his lower extremities and across his chest wall  Pulmonary:      Effort: Pulmonary effort is normal.      Breath sounds: Normal breath sounds.   Abdominal:      General: Bowel sounds are normal. There is no abdominal bruit.      Palpations: Abdomen is soft.      Tenderness: There is no abdominal tenderness.   Musculoskeletal:         General: Normal range of motion.   Skin:     General: Skin is warm and dry.   Neurological:      General: No focal deficit present.      Mental Status: He is alert and oriented to person, place, and time.      Cranial Nerves: No cranial nerve deficit.   Psychiatric:         Mood and Affect: Mood normal.         Behavior: Behavior normal.         Thought Content: Thought content normal.         Judgment: Judgment normal.            No results found.       Patient Active Problem List   Diagnosis   • Cigarette smoker   • Obesity (BMI 30-39.9)   • Numbness and tingling of both lower extremities   • Numbness and tingling of both upper extremities   • Change in bowel habits   • Hypertension   • Chronic venous hypertension with inflammation involving both sides         ICD-10-CM  ICD-9-CM   1. Chronic venous hypertension with inflammation involving both sides  I87.323 459.32   2. Preop testing  Z01.818 V72.84   3. Encounter for monitoring antiplatelet therapy  Z51.81 V58.83    Z79.02 V58.63   4. Venous insufficiency (chronic) (peripheral)   I87.2 459.81   5. Essential hypertension  I10 401.9   6. Cigarette smoker  F17.210 305.1         Plan: After thoroughly evaluating Ezra King IV, I believe the best course of action is to proceed with endovenous closure of the right lower extremity greater saphenous vein using Venaseal.  He does have significant venous insufficiency to bilateral lower extremities he also has greater than 3 seconds of reflux mid calf as well as  with greater than 3 seconds of reflux.  Given these findings, Venaseal closure would be the best option for him.  Risks of saphenous vein closure include, but are not limited to, bleeding, infection, vessel damage, sensitivity reaction, DVT, phlebitis, and pulmonary embolus.  The patient understands these risks and wishes to proceed with procedure.  I would recommend he continue wearing compression stockings on a daily basis and keep his legs well moisturized and elevated when he is not on them.  I did explain what to do if the varicosities ruptured again.  I instructed him to hold direct pressure and elevate his extremity.  This should resolve within about 10 to 15 minutes.  I did  extensively on smoking cessation, and the patient was advised of the continued risks of smoking.  I provided over 3  minutes counseling on this matter.  Currently, he is not interested in smoking cessation. Patient's Body mass index is 26.58 kg/m². BMI is above normal parameters. Recommendations include: educational material.  The patient can continue taking their current medication regimen as previously planned.  This was all discussed in full with complete understanding.    Thank you for allowing me to participate in the care of your  patient.  Please do not hesitate with any questions or concerns.  I will keep you aware of any further encounters with Ezra King IV.        Sincerely yours,         OLVIER Alonzo

## 2021-04-22 ENCOUNTER — TELEPHONE (OUTPATIENT)
Dept: VASCULAR SURGERY | Facility: CLINIC | Age: 41
End: 2021-04-22

## 2021-04-22 PROBLEM — Z01.818 PREOP TESTING: Status: ACTIVE | Noted: 2021-04-22

## 2021-04-22 NOTE — TELEPHONE ENCOUNTER
Left message requesting patient to return my call in reference to upcoming procedure. Patient pre work is scheduled for 5/4/2021 at 1015 am.  Patient procedure is scheduled for 5/7/2021 at 500 am.  Advised of office number for patient to return my call.

## 2021-04-26 ENCOUNTER — TELEPHONE (OUTPATIENT)
Dept: VASCULAR SURGERY | Facility: CLINIC | Age: 41
End: 2021-04-26

## 2021-04-26 NOTE — TELEPHONE ENCOUNTER
Spoke with patient and advised of upcoming procedure.  Patient pre work is scheduled for 5/4/2021 at 1015 am.  Patient procedure is scheduled for 5/7/2021 at 500 am.   Patient was advised of Covid testing and visitation.  Patient advised of location time and prep.  Patient expressed understanding for all that was discussed.

## 2021-04-30 ENCOUNTER — TRANSCRIBE ORDERS (OUTPATIENT)
Dept: ADMINISTRATIVE | Facility: HOSPITAL | Age: 41
End: 2021-04-30

## 2021-04-30 DIAGNOSIS — Z01.818 PREOP TESTING: Primary | ICD-10-CM

## 2021-05-04 ENCOUNTER — PRE-ADMISSION TESTING (OUTPATIENT)
Dept: PREADMISSION TESTING | Facility: HOSPITAL | Age: 41
End: 2021-05-04

## 2021-05-04 ENCOUNTER — LAB (OUTPATIENT)
Dept: LAB | Facility: HOSPITAL | Age: 41
End: 2021-05-04

## 2021-05-04 VITALS
WEIGHT: 209.22 LBS | HEIGHT: 74 IN | BODY MASS INDEX: 26.85 KG/M2 | OXYGEN SATURATION: 100 % | RESPIRATION RATE: 16 BRPM | HEART RATE: 77 BPM | DIASTOLIC BLOOD PRESSURE: 90 MMHG | SYSTOLIC BLOOD PRESSURE: 128 MMHG

## 2021-05-04 DIAGNOSIS — Z79.02 ENCOUNTER FOR MONITORING ANTIPLATELET THERAPY: ICD-10-CM

## 2021-05-04 DIAGNOSIS — I87.2 VENOUS INSUFFICIENCY (CHRONIC) (PERIPHERAL): ICD-10-CM

## 2021-05-04 DIAGNOSIS — I87.323 CHRONIC VENOUS HYPERTENSION WITH INFLAMMATION INVOLVING BOTH SIDES: ICD-10-CM

## 2021-05-04 DIAGNOSIS — Z51.81 ENCOUNTER FOR MONITORING ANTIPLATELET THERAPY: ICD-10-CM

## 2021-05-04 DIAGNOSIS — Z01.818 PREOP TESTING: ICD-10-CM

## 2021-05-04 LAB
ANION GAP SERPL CALCULATED.3IONS-SCNC: 11 MMOL/L (ref 5–15)
APTT PPP: 30.2 SECONDS (ref 24.1–35)
BUN SERPL-MCNC: 7 MG/DL (ref 6–20)
BUN/CREAT SERPL: 9.5 (ref 7–25)
CALCIUM SPEC-SCNC: 9 MG/DL (ref 8.6–10.5)
CHLORIDE SERPL-SCNC: 101 MMOL/L (ref 98–107)
CO2 SERPL-SCNC: 30 MMOL/L (ref 22–29)
CREAT SERPL-MCNC: 0.74 MG/DL (ref 0.76–1.27)
DEPRECATED RDW RBC AUTO: 47.4 FL (ref 37–54)
ERYTHROCYTE [DISTWIDTH] IN BLOOD BY AUTOMATED COUNT: 14 % (ref 12.3–15.4)
GFR SERPL CREATININE-BSD FRML MDRD: 116 ML/MIN/1.73
GLUCOSE SERPL-MCNC: 81 MG/DL (ref 65–99)
HCT VFR BLD AUTO: 41.5 % (ref 37.5–51)
HGB BLD-MCNC: 13.8 G/DL (ref 13–17.7)
HYPOCHROMIA BLD QL: ABNORMAL
INR PPP: 1.19 (ref 0.91–1.09)
LYMPHOCYTES # BLD MANUAL: 2.01 10*3/MM3 (ref 0.7–3.1)
LYMPHOCYTES NFR BLD MANUAL: 4 % (ref 5–12)
LYMPHOCYTES NFR BLD MANUAL: 48 % (ref 19.6–45.3)
MCH RBC QN AUTO: 30.8 PG (ref 26.6–33)
MCHC RBC AUTO-ENTMCNC: 33.3 G/DL (ref 31.5–35.7)
MCV RBC AUTO: 92.6 FL (ref 79–97)
MONOCYTES # BLD AUTO: 0.15 10*3/MM3 (ref 0.1–0.9)
NEUTROPHILS # BLD AUTO: 1.7 10*3/MM3 (ref 1.7–7)
NEUTROPHILS NFR BLD MANUAL: 42 % (ref 42.7–76)
NEUTS BAND NFR BLD MANUAL: 2 % (ref 0–5)
PLAT MORPH BLD: NORMAL
PLATELET # BLD AUTO: 233 10*3/MM3 (ref 140–450)
PMV BLD AUTO: 9.1 FL (ref 6–12)
POTASSIUM SERPL-SCNC: 3.9 MMOL/L (ref 3.5–5.2)
PROTHROMBIN TIME: 14.2 SECONDS (ref 11.5–13.4)
RBC # BLD AUTO: 4.48 10*6/MM3 (ref 4.14–5.8)
SARS-COV-2 ORF1AB RESP QL NAA+PROBE: NOT DETECTED
SODIUM SERPL-SCNC: 142 MMOL/L (ref 136–145)
VARIANT LYMPHS NFR BLD MANUAL: 4 % (ref 0–5)
WBC # BLD AUTO: 3.87 10*3/MM3 (ref 3.4–10.8)
WBC MORPH BLD: NORMAL

## 2021-05-04 PROCEDURE — 85007 BL SMEAR W/DIFF WBC COUNT: CPT

## 2021-05-04 PROCEDURE — 85025 COMPLETE CBC W/AUTO DIFF WBC: CPT

## 2021-05-04 PROCEDURE — U0005 INFEC AGEN DETEC AMPLI PROBE: HCPCS | Performed by: SURGERY

## 2021-05-04 PROCEDURE — 36415 COLL VENOUS BLD VENIPUNCTURE: CPT

## 2021-05-04 PROCEDURE — U0004 COV-19 TEST NON-CDC HGH THRU: HCPCS | Performed by: SURGERY

## 2021-05-04 PROCEDURE — 85610 PROTHROMBIN TIME: CPT

## 2021-05-04 PROCEDURE — 80048 BASIC METABOLIC PNL TOTAL CA: CPT

## 2021-05-04 PROCEDURE — C9803 HOPD COVID-19 SPEC COLLECT: HCPCS | Performed by: SURGERY

## 2021-05-04 PROCEDURE — 85730 THROMBOPLASTIN TIME PARTIAL: CPT

## 2021-05-04 PROCEDURE — 93010 ELECTROCARDIOGRAM REPORT: CPT | Performed by: INTERNAL MEDICINE

## 2021-05-04 PROCEDURE — 93005 ELECTROCARDIOGRAM TRACING: CPT

## 2021-05-05 LAB
QT INTERVAL: 432 MS
QTC INTERVAL: 469 MS

## 2021-05-06 ENCOUNTER — TELEPHONE (OUTPATIENT)
Dept: VASCULAR SURGERY | Facility: CLINIC | Age: 41
End: 2021-05-06

## 2021-05-06 NOTE — TELEPHONE ENCOUNTER
Spoke with patient and reminded of 500 am arrival time tomorrow for his procedure with Dr. Varner.  Patient expressed understanding for all that was discussed.

## 2021-05-07 ENCOUNTER — APPOINTMENT (OUTPATIENT)
Dept: ULTRASOUND IMAGING | Facility: HOSPITAL | Age: 41
End: 2021-05-07

## 2021-05-07 ENCOUNTER — ANESTHESIA (OUTPATIENT)
Dept: PERIOP | Facility: HOSPITAL | Age: 41
End: 2021-05-07

## 2021-05-07 ENCOUNTER — APPOINTMENT (OUTPATIENT)
Dept: INTERVENTIONAL RADIOLOGY/VASCULAR | Facility: HOSPITAL | Age: 41
End: 2021-05-07

## 2021-05-07 ENCOUNTER — ANESTHESIA EVENT (OUTPATIENT)
Dept: PERIOP | Facility: HOSPITAL | Age: 41
End: 2021-05-07

## 2021-05-07 ENCOUNTER — HOSPITAL ENCOUNTER (OUTPATIENT)
Facility: HOSPITAL | Age: 41
Setting detail: HOSPITAL OUTPATIENT SURGERY
Discharge: HOME | End: 2021-05-07
Attending: SURGERY | Admitting: SURGERY

## 2021-05-07 VITALS
HEART RATE: 74 BPM | SYSTOLIC BLOOD PRESSURE: 118 MMHG | OXYGEN SATURATION: 95 % | TEMPERATURE: 98.1 F | DIASTOLIC BLOOD PRESSURE: 78 MMHG | RESPIRATION RATE: 18 BRPM

## 2021-05-07 DIAGNOSIS — Z01.818 PREOP TESTING: ICD-10-CM

## 2021-05-07 DIAGNOSIS — I87.323 CHRONIC VENOUS HYPERTENSION WITH INFLAMMATION INVOLVING BOTH SIDES: ICD-10-CM

## 2021-05-07 PROCEDURE — 75820 VEIN X-RAY ARM/LEG: CPT

## 2021-05-07 PROCEDURE — 25010000002 MIDAZOLAM PER 1 MG: Performed by: ANESTHESIOLOGY

## 2021-05-07 PROCEDURE — 25010000002 PROPOFOL 10 MG/ML EMULSION: Performed by: NURSE ANESTHETIST, CERTIFIED REGISTERED

## 2021-05-07 PROCEDURE — 36482 ENDOVEN THER CHEM ADHES 1ST: CPT | Performed by: SURGERY

## 2021-05-07 PROCEDURE — C1887 CATHETER, GUIDING: HCPCS | Performed by: SURGERY

## 2021-05-07 PROCEDURE — 25010000002 CEFAZOLIN PER 500 MG: Performed by: NURSE PRACTITIONER

## 2021-05-07 PROCEDURE — 76000 FLUOROSCOPY <1 HR PHYS/QHP: CPT

## 2021-05-07 PROCEDURE — 76937 US GUIDE VASCULAR ACCESS: CPT

## 2021-05-07 PROCEDURE — C1894 INTRO/SHEATH, NON-LASER: HCPCS | Performed by: SURGERY

## 2021-05-07 PROCEDURE — 75820 VEIN X-RAY ARM/LEG: CPT | Performed by: SURGERY

## 2021-05-07 PROCEDURE — C1889 IMPLANT/INSERT DEVICE, NOC: HCPCS | Performed by: SURGERY

## 2021-05-07 PROCEDURE — 0 IOVERSOL 74 % SOLUTION: Performed by: SURGERY

## 2021-05-07 PROCEDURE — 25010000002 FENTANYL CITRATE (PF) 100 MCG/2ML SOLUTION: Performed by: NURSE ANESTHETIST, CERTIFIED REGISTERED

## 2021-05-07 DEVICE — SYS CLS VENASEAL TISS ADHS: Type: IMPLANTABLE DEVICE | Site: LEG | Status: FUNCTIONAL

## 2021-05-07 RX ORDER — SODIUM CHLORIDE 0.9 % (FLUSH) 0.9 %
3-10 SYRINGE (ML) INJECTION AS NEEDED
Status: DISCONTINUED | OUTPATIENT
Start: 2021-05-07 | End: 2021-05-07 | Stop reason: HOSPADM

## 2021-05-07 RX ORDER — SODIUM CHLORIDE 0.9 % (FLUSH) 0.9 %
3 SYRINGE (ML) INJECTION AS NEEDED
Status: DISCONTINUED | OUTPATIENT
Start: 2021-05-07 | End: 2021-05-07 | Stop reason: HOSPADM

## 2021-05-07 RX ORDER — OXYCODONE AND ACETAMINOPHEN 7.5; 325 MG/1; MG/1
2 TABLET ORAL EVERY 4 HOURS PRN
Status: CANCELLED | OUTPATIENT
Start: 2021-05-07 | End: 2021-05-17

## 2021-05-07 RX ORDER — LIDOCAINE HYDROCHLORIDE 20 MG/ML
INJECTION, SOLUTION EPIDURAL; INFILTRATION; INTRACAUDAL; PERINEURAL AS NEEDED
Status: DISCONTINUED | OUTPATIENT
Start: 2021-05-07 | End: 2021-05-07 | Stop reason: SURG

## 2021-05-07 RX ORDER — FENTANYL CITRATE 50 UG/ML
25 INJECTION, SOLUTION INTRAMUSCULAR; INTRAVENOUS
Status: CANCELLED | OUTPATIENT
Start: 2021-05-07

## 2021-05-07 RX ORDER — ONDANSETRON 2 MG/ML
4 INJECTION INTRAMUSCULAR; INTRAVENOUS ONCE AS NEEDED
Status: CANCELLED | OUTPATIENT
Start: 2021-05-07

## 2021-05-07 RX ORDER — HYDROCODONE BITARTRATE AND ACETAMINOPHEN 5; 325 MG/1; MG/1
1-2 TABLET ORAL EVERY 6 HOURS PRN
Qty: 20 TABLET | Refills: 0 | Status: SHIPPED | OUTPATIENT
Start: 2021-05-07

## 2021-05-07 RX ORDER — SODIUM CHLORIDE 0.9 % (FLUSH) 0.9 %
3 SYRINGE (ML) INJECTION EVERY 12 HOURS SCHEDULED
Status: DISCONTINUED | OUTPATIENT
Start: 2021-05-07 | End: 2021-05-07 | Stop reason: HOSPADM

## 2021-05-07 RX ORDER — MIDAZOLAM HYDROCHLORIDE 1 MG/ML
1 INJECTION INTRAMUSCULAR; INTRAVENOUS
Status: DISCONTINUED | OUTPATIENT
Start: 2021-05-07 | End: 2021-05-07 | Stop reason: HOSPADM

## 2021-05-07 RX ORDER — FENTANYL CITRATE 50 UG/ML
INJECTION, SOLUTION INTRAMUSCULAR; INTRAVENOUS AS NEEDED
Status: DISCONTINUED | OUTPATIENT
Start: 2021-05-07 | End: 2021-05-07 | Stop reason: SURG

## 2021-05-07 RX ORDER — FLUMAZENIL 0.1 MG/ML
0.2 INJECTION INTRAVENOUS AS NEEDED
Status: CANCELLED | OUTPATIENT
Start: 2021-05-07

## 2021-05-07 RX ORDER — PROPOFOL 10 MG/ML
VIAL (ML) INTRAVENOUS AS NEEDED
Status: DISCONTINUED | OUTPATIENT
Start: 2021-05-07 | End: 2021-05-07 | Stop reason: SURG

## 2021-05-07 RX ORDER — BUPIVACAINE HCL/0.9 % NACL/PF 0.1 %
2 PLASTIC BAG, INJECTION (ML) EPIDURAL ONCE
Status: COMPLETED | OUTPATIENT
Start: 2021-05-07 | End: 2021-05-07

## 2021-05-07 RX ORDER — LIDOCAINE HYDROCHLORIDE 10 MG/ML
0.5 INJECTION, SOLUTION EPIDURAL; INFILTRATION; INTRACAUDAL; PERINEURAL ONCE AS NEEDED
Status: DISCONTINUED | OUTPATIENT
Start: 2021-05-07 | End: 2021-05-07 | Stop reason: HOSPADM

## 2021-05-07 RX ORDER — HYDROCODONE BITARTRATE AND ACETAMINOPHEN 5; 325 MG/1; MG/1
1 TABLET ORAL ONCE AS NEEDED
Status: DISCONTINUED | OUTPATIENT
Start: 2021-05-07 | End: 2021-05-07 | Stop reason: HOSPADM

## 2021-05-07 RX ORDER — ACETAMINOPHEN 500 MG
1000 TABLET ORAL ONCE
Status: COMPLETED | OUTPATIENT
Start: 2021-05-07 | End: 2021-05-07

## 2021-05-07 RX ORDER — LABETALOL HYDROCHLORIDE 5 MG/ML
5 INJECTION, SOLUTION INTRAVENOUS
Status: CANCELLED | OUTPATIENT
Start: 2021-05-07

## 2021-05-07 RX ORDER — SODIUM CHLORIDE, SODIUM LACTATE, POTASSIUM CHLORIDE, CALCIUM CHLORIDE 600; 310; 30; 20 MG/100ML; MG/100ML; MG/100ML; MG/100ML
1000 INJECTION, SOLUTION INTRAVENOUS CONTINUOUS
Status: DISCONTINUED | OUTPATIENT
Start: 2021-05-07 | End: 2021-05-07 | Stop reason: HOSPADM

## 2021-05-07 RX ORDER — NALOXONE HCL 0.4 MG/ML
0.4 VIAL (ML) INJECTION AS NEEDED
Status: CANCELLED | OUTPATIENT
Start: 2021-05-07

## 2021-05-07 RX ORDER — SODIUM CHLORIDE 9 MG/ML
INJECTION, SOLUTION INTRAVENOUS AS NEEDED
Status: DISCONTINUED | OUTPATIENT
Start: 2021-05-07 | End: 2021-05-07 | Stop reason: HOSPADM

## 2021-05-07 RX ORDER — IBUPROFEN 600 MG/1
600 TABLET ORAL ONCE AS NEEDED
Status: CANCELLED | OUTPATIENT
Start: 2021-05-07

## 2021-05-07 RX ORDER — SODIUM CHLORIDE, SODIUM LACTATE, POTASSIUM CHLORIDE, CALCIUM CHLORIDE 600; 310; 30; 20 MG/100ML; MG/100ML; MG/100ML; MG/100ML
100 INJECTION, SOLUTION INTRAVENOUS CONTINUOUS
Status: DISCONTINUED | OUTPATIENT
Start: 2021-05-07 | End: 2021-05-07 | Stop reason: HOSPADM

## 2021-05-07 RX ORDER — ONDANSETRON 2 MG/ML
4 INJECTION INTRAMUSCULAR; INTRAVENOUS ONCE AS NEEDED
Status: DISCONTINUED | OUTPATIENT
Start: 2021-05-07 | End: 2021-05-07 | Stop reason: HOSPADM

## 2021-05-07 RX ORDER — OXYCODONE AND ACETAMINOPHEN 10; 325 MG/1; MG/1
1 TABLET ORAL ONCE AS NEEDED
Status: CANCELLED | OUTPATIENT
Start: 2021-05-07

## 2021-05-07 RX ADMIN — ACETAMINOPHEN 1000 MG: 500 TABLET, FILM COATED ORAL at 08:52

## 2021-05-07 RX ADMIN — LIDOCAINE HYDROCHLORIDE 100 MG: 20 INJECTION, SOLUTION EPIDURAL; INFILTRATION; INTRACAUDAL; PERINEURAL at 09:05

## 2021-05-07 RX ADMIN — PROPOFOL 50 MG: 10 INJECTION, EMULSION INTRAVENOUS at 09:05

## 2021-05-07 RX ADMIN — FENTANYL CITRATE 100 MCG: 50 INJECTION, SOLUTION INTRAMUSCULAR; INTRAVENOUS at 09:04

## 2021-05-07 RX ADMIN — PROPOFOL 50 MG: 10 INJECTION, EMULSION INTRAVENOUS at 09:07

## 2021-05-07 RX ADMIN — Medication 2 G: at 09:09

## 2021-05-07 RX ADMIN — PROPOFOL 100 MCG/KG/MIN: 10 INJECTION, EMULSION INTRAVENOUS at 09:06

## 2021-05-07 RX ADMIN — MIDAZOLAM 1 MG: 1 INJECTION INTRAMUSCULAR; INTRAVENOUS at 08:52

## 2021-05-07 RX ADMIN — SODIUM CHLORIDE, POTASSIUM CHLORIDE, SODIUM LACTATE AND CALCIUM CHLORIDE 1000 ML: 600; 310; 30; 20 INJECTION, SOLUTION INTRAVENOUS at 06:40

## 2021-05-07 NOTE — ANESTHESIA PREPROCEDURE EVALUATION
Anesthesia Evaluation     Patient summary reviewed   no history of anesthetic complications:  NPO Solid Status: > 8 hours  NPO Liquid Status: > 2 hours           Airway   Mallampati: I  TM distance: >3 FB  Neck ROM: full  No difficulty expected  Dental    (+) upper dentures    Pulmonary    (+) a smoker Current,   (-) sleep apnea  Cardiovascular   Exercise tolerance: good (4-7 METS)    (+) hypertension, PVD,   (-) CAD      Neuro/Psych  (+) numbness (neuropathy bilateral upper extremities),     (-) CVA  GI/Hepatic/Renal/Endo    (+)   liver disease fatty liver disease,   (-)  obesity, morbid obesity, no renal disease, diabetes    Musculoskeletal     Abdominal    Substance History      OB/GYN          Other   arthritis,                        Anesthesia Plan    ASA 2     MAC     intravenous induction     Anesthetic plan, all risks, benefits, and alternatives have been provided, discussed and informed consent has been obtained with: patient.

## 2021-05-07 NOTE — ANESTHESIA POSTPROCEDURE EVALUATION
Patient: Ezra King IV    Procedure Summary     Date: 05/07/21 Room / Location: [facility] OR  / [facility] HYBRID OR 12    Anesthesia Start: 0902 Anesthesia Stop: 1032    Procedure: RIGHT SAPHENOUS VEIN CLOSURE using Venaseal (Right Leg Lower) Diagnosis:       Chronic venous hypertension with inflammation involving both sides      Preop testing      (Chronic venous hypertension with inflammation involving both sides [I87.323])      (Preop testing [Z01.818])    Surgeons: Angel Varner DO Provider: Niyah Hathaway CRNA    Anesthesia Type: MAC ASA Status: 2          Anesthesia Type: MAC    Vitals  Vitals Value Taken Time   /89 05/07/21 1045   Temp 98.1 °F (36.7 °C) 05/07/21 1030   Pulse 67 05/07/21 1046   Resp 18 05/07/21 1040   SpO2 96 % 05/07/21 1046   Vitals shown include unvalidated device data.        Post Anesthesia Care and Evaluation    Patient location during evaluation: PACU  Patient participation: complete - patient participated  Level of consciousness: awake and alert  Pain management: adequate  Airway patency: patent  Anesthetic complications: No anesthetic complications    Cardiovascular status: acceptable  Respiratory status: acceptable  Hydration status: acceptable    Comments: Blood pressure 137/100, pulse 72, temperature 98.1 °F (36.7 °C), temperature source Temporal, resp. rate 18, SpO2 94 %.    Pt discharged from PACU based on maya score >8

## 2021-05-11 ENCOUNTER — HOSPITAL ENCOUNTER (OUTPATIENT)
Dept: ULTRASOUND IMAGING | Facility: HOSPITAL | Age: 41
Discharge: HOME | End: 2021-05-11
Admitting: SURGERY

## 2021-05-11 ENCOUNTER — TELEPHONE (OUTPATIENT)
Dept: PODIATRY | Facility: CLINIC | Age: 41
End: 2021-05-11

## 2021-05-11 DIAGNOSIS — I87.323 CHRONIC VENOUS HYPERTENSION WITH INFLAMMATION INVOLVING BOTH SIDES: ICD-10-CM

## 2021-05-11 PROCEDURE — 93971 EXTREMITY STUDY: CPT

## 2021-05-11 PROCEDURE — 93971 EXTREMITY STUDY: CPT | Performed by: SURGERY

## 2021-05-11 NOTE — TELEPHONE ENCOUNTER
Galina called from the [facility] and stated that the patient was there for testing, but was only 4 days post op from [facility].  She wanted to be sure that it was okay for the patient to have the testing.      I spoke to Lisa and she said that since he was already here it was okay to go ahead and do his test.  I let Galina know.

## 2021-05-12 ENCOUNTER — TELEPHONE (OUTPATIENT)
Dept: VASCULAR SURGERY | Facility: CLINIC | Age: 41
End: 2021-05-12

## 2021-05-12 NOTE — TELEPHONE ENCOUNTER
Returned patient call regarding pain to his right leg in the calf as well as the thigh.  Varicosities are red and painful.  He did have his postoperative testing which showed closure of large varicosities.  I did encourage him to continue with compression past his compression stockings all the way up the thigh, apply warm compresses as often as he can, and use ibuprofen around-the-clock for the next couple of days.  He is scheduled to see me in 2 days.  I encouraged him to call me if anything changes but this is superficial phlebitis from large varicosities closing.  He does verbalize understanding.

## 2021-05-14 ENCOUNTER — OFFICE VISIT (OUTPATIENT)
Dept: VASCULAR SURGERY | Facility: CLINIC | Age: 41
End: 2021-05-14

## 2021-05-14 VITALS
WEIGHT: 207 LBS | OXYGEN SATURATION: 98 % | HEART RATE: 104 BPM | HEIGHT: 74 IN | DIASTOLIC BLOOD PRESSURE: 88 MMHG | BODY MASS INDEX: 26.56 KG/M2 | SYSTOLIC BLOOD PRESSURE: 138 MMHG

## 2021-05-14 DIAGNOSIS — I87.323 CHRONIC VENOUS HYPERTENSION WITH INFLAMMATION INVOLVING BOTH SIDES: Primary | ICD-10-CM

## 2021-05-14 DIAGNOSIS — I80.01 THROMBOPHLEBITIS OF SUPERFICIAL VEINS OF RIGHT LOWER EXTREMITY: ICD-10-CM

## 2021-05-14 DIAGNOSIS — I10 ESSENTIAL HYPERTENSION: ICD-10-CM

## 2021-05-14 PROCEDURE — 99213 OFFICE O/P EST LOW 20 MIN: CPT | Performed by: NURSE PRACTITIONER

## 2021-05-14 NOTE — PROGRESS NOTES
"5/14/2021       Farhat Guillory MD  [address]  [place] [address]    Ezra King IV  1980    Chief Complaint   Patient presents with   • Follow-up     1 Week Post-Op Follow Up For RIGHT SAPHENOUS VEIN CLOSURE using Venaseal. Test [id number] US pad venous lower ext right. Patient denies any stroke like symptoms.    • Smoker     Patient is a Current Everyday Smoker    • Med Management     Verbally verified medications with patient        Dear Farhat Guillory MD       HPI  I had the pleasure of seeing your patient Ezra King IV in the office today.  As you recall, Ezra King IV is a 40 y.o.  male who we are following for chronic venous insufficiency.  He has had varicosities in both lower extremities for many years but has worsened over the past couple years.  He does report having more swelling and discomfort to his legs.  He also had another rupture of varicosity.   Unfortunately, he does smoke half a pack of cigarettes per day.  He does have varicosities across to his chest wall but CTV was checked with no evidence of occlusions.  He did undergo endovenous closure of the right lower extremity greater saphenous vein with Venaseal on 5/7/2021.  He is having pain to clusters of varicosities.  He did have noninvasive testing performed, which I did review in office.    Review of Systems   Constitutional: Negative.    HENT: Negative.    Eyes: Negative.    Respiratory: Negative.    Cardiovascular: Positive for leg swelling.        Leg cramping, varicosities to bilateral lower extremities   Gastrointestinal: Negative.    Endocrine: Negative.    Genitourinary: Negative.    Musculoskeletal: Negative.    Skin: Negative.    Allergic/Immunologic: Negative.    Neurological: Negative.    Hematological: Negative.    Psychiatric/Behavioral: Negative.         /88 (BP Location: Left arm, Patient Position: Sitting, Cuff Size: Adult)   Pulse 104   Ht 188 cm (74\")   Wt 93.9 kg (207 lb)   SpO2 98%   " BMI 26.58 kg/m²   Physical Exam  Vitals and nursing note reviewed.   Constitutional:       General: He is not in acute distress.     Appearance: Normal appearance. He is well-developed. He is not diaphoretic.   HENT:      Head: Normocephalic and atraumatic.   Neck:      Vascular: No carotid bruit or JVD.   Cardiovascular:      Rate and Rhythm: Normal rate and regular rhythm.      Pulses: Normal pulses.           Femoral pulses are 2+ on the right side and 2+ on the left side.       Popliteal pulses are 2+ on the right side and 2+ on the left side.        Dorsalis pedis pulses are 2+ on the right side and 2+ on the left side.        Posterior tibial pulses are 2+ on the right side and 2+ on the left side.      Heart sounds: Normal heart sounds, S1 normal and S2 normal. No murmur heard.   No friction rub. No gallop.       Comments: Significant varicosities to his lower extremities and across his chest wall  Superficial phlebitis to varicose vein clusters  Pulmonary:      Effort: Pulmonary effort is normal.      Breath sounds: Normal breath sounds.   Abdominal:      General: Bowel sounds are normal. There is no abdominal bruit.      Palpations: Abdomen is soft.      Tenderness: There is no abdominal tenderness.   Musculoskeletal:         General: Normal range of motion.   Skin:     General: Skin is warm and dry.   Neurological:      General: No focal deficit present.      Mental Status: He is alert and oriented to person, place, and time.      Cranial Nerves: No cranial nerve deficit.   Psychiatric:         Mood and Affect: Mood normal.         Behavior: Behavior normal.         Thought Content: Thought content normal.         Judgment: Judgment normal.            US Guided Vascular Access    Result Date: 5/8/2021  Narrative: History: Swelling, venous insufficiency.  Comment: Limited right lower extremity venous duplex was performed using gray scale imaging and color flow Doppler.  FINDINGS: The right greater saphenous  vein was successfully cannulated under ultrasound guidance and the endovenous closure catheter was successfully positioned with tip 3 cm distal to the saphenofemoral junction.      Impression: Successful cannulation and endovenous closure catheter placement at the time of right lower extremity greater saphenous vein radiofrequency ablation. This report was finalized on 05/08/2021 07:30 by Dr. Olvin Cervantes MD.    IR Venogram Extremity, FL C Arm During Surgery    Result Date: 5/10/2021  Narrative: Performed by Dr. Varner. Please see procedure note. This report was finalized on 05/10/2021 15:32 by Dr. Angel Varner MD.    US Venous Doppler Lower Extremity Right (duplex)    Result Date: 5/12/2021  Narrative: History: Right      Impression: Impression: 1. There is no evidence of deep venous thrombosis of the right lower extremity. 2. The greater saphenous vein is closed from zones 2 through 8. There are thrombosed varicose veins extending from the mid thigh to the mid calf. There is a large varicose vein clusters that is open on the posterior knee.  Comments: Right lower extremity venous duplex exam was performed using color Doppler flow, Doppler wave form analysis, and grayscale imaging, with and without compression. There is no evidence of deep venous thrombosis of the common femoral, superficial femoral, popliteal, posterior tibial, and peroneal veins. There is no thrombus identified in the saphenofemoral junction.  This report was finalized on 05/12/2021 11:54 by Dr. nAgel Varner MD.         Patient Active Problem List   Diagnosis   • Cigarette smoker   • Obesity (BMI 30-39.9)   • Numbness and tingling of both lower extremities   • Numbness and tingling of both upper extremities   • Change in bowel habits   • Hypertension   • Chronic venous hypertension with inflammation involving both sides   • Preop testing         ICD-10-CM ICD-9-CM   1. Chronic venous hypertension with inflammation involving both sides   I87.323 459.32   2. Essential hypertension  I10 401.9   3. Thrombophlebitis of superficial veins of right lower extremity  I80.01 451.0         Plan: After thoroughly evaluating Ezra King IV, I believe the best course of action is to remain conservative from vascular surgery standpoint.  He did have endovenous closure with painful superficial phlebitis following the procedure.  These are large clusters of varicosities that are thrombosed as well.  I did ask him to continue wearing his compression and also wrap an Ace wrap from the knee to the thigh.  I have also asked him to apply warm compresses frequently and use ibuprofen over the next few days to help decrease the inflammation.  His testing is negative for VTE and greater saphenous vein is closed as expected with thrombosed clusters of varicosities.  I have asked him to call me if anything significant changes otherwise we will see him back in 3 months and give him time to rest.  If he is doing well at that time we can proceed with venous seal closure of the left lower extremity or he is welcome to call sooner if he would like to proceed.  Risks of saphenous vein closure include, but are not limited to, bleeding, infection, vessel damage, sensitivity reaction, DVT, phlebitis, and pulmonary embolus.  The patient understands these risks and wishes to proceed with procedure.   I did explain what to do if the varicosities ruptured again.  I instructed him to hold direct pressure and elevate his extremity.  This should resolve within about 10 to 15 minutes.  I did  extensively on smoking cessation, and the patient was advised of the continued risks of smoking.  I provided over 3  minutes counseling on this matter.  Currently, he is not interested in smoking cessation. Patient's Body mass index is 26.58 kg/m². BMI is above normal parameters. Recommendations include: educational material.  The patient can continue taking their current medication regimen as  previously planned.  This was all discussed in full with complete understanding.    Thank you for allowing me to participate in the care of your patient.  Please do not hesitate with any questions or concerns.  I will keep you aware of any further encounters with Ezra King IV.        Sincerely yours,         OLIVER Alonzo

## 2021-08-12 ENCOUNTER — TELEPHONE (OUTPATIENT)
Dept: VASCULAR SURGERY | Facility: CLINIC | Age: 41
End: 2021-08-12

## 2022-03-07 NOTE — TELEPHONE ENCOUNTER
Spoke with patient and advised that his arrival time for his procedure with Dr. Varner on Monday had been moved to 500 am.  Patient expressed understanding for all that was discussed.    arm/back

## (undated) DEVICE — BNDG ELAS W/CLIP 6IN 10YD LF STRL

## (undated) DEVICE — SNAR POLYP SENSATION MICRO OVL 13 240X40

## (undated) DEVICE — INTENDED FOR TISSUE SEPARATION, AND OTHER PROCEDURES THAT REQUIRE A SHARP SURGICAL BLADE TO PUNCTURE OR CUT.: Brand: BARD-PARKER ® STAINLESS STEEL BLADES

## (undated) DEVICE — 3M™ STERI-STRIP™ REINFORCED ADHESIVE SKIN CLOSURES, R1547, 1/2 IN X 4 IN (12 MM X 100 MM), 6 STRIPS/ENVELOPE: Brand: 3M™ STERI-STRIP™

## (undated) DEVICE — MASK,OXYGEN,MED CONC,ADLT,7' TUB, UC: Brand: PENDING

## (undated) DEVICE — BNDG ADHS CURAD FLX/FABRC 1X3IN STRL LF

## (undated) DEVICE — SNAR POLYP CAPTIVATOR MICROHEX 13 240CM

## (undated) DEVICE — CVR PROB GEN PURP W ISOSILK 6X48

## (undated) DEVICE — GOWN,NON-REINFORCED,SIRUS,SET IN SLV,XL: Brand: MEDLINE

## (undated) DEVICE — RADIFOCUS GLIDECATH: Brand: GLIDECATH

## (undated) DEVICE — GLV SURG SENSICARE W/ALOE PF LF 7.5 STRL

## (undated) DEVICE — SHEATH INTRO MICRO 7F 11CM

## (undated) DEVICE — SYR LL TP 10ML STRL

## (undated) DEVICE — PK TURNOVER RM ADV

## (undated) DEVICE — THE CHANNEL CLEANING BRUSH IS A NYLON FLEXI BRUSH ATTACHED TO A FLEXIBLE PLASTIC SHEATH DESIGNED TO SAFELY REMOVE DEBRIS FROM FLEXIBLE ENDOSCOPES.

## (undated) DEVICE — SENSR O2 OXIMAX FNGR A/ 18IN NONSTR

## (undated) DEVICE — THE SINGLE USE ETRAP – POLYP TRAP IS USED FOR SUCTION RETRIEVAL OF ENDOSCOPICALLY REMOVED POLYPS.: Brand: ETRAP

## (undated) DEVICE — ENDOGATOR AUXILIARY WATER JET CONNECTOR: Brand: ENDOGATOR

## (undated) DEVICE — APPL CHLORAPREP HI/LITE 26ML ORNG

## (undated) DEVICE — TBG SMPL FLTR LINE NASL 02/C02 A/ BX/100

## (undated) DEVICE — SPNG GZ STRL 2S 4X4 12PLY

## (undated) DEVICE — BNDG ELAS ECON W/CLIP 4IN 5YD LF STRL

## (undated) DEVICE — Device: Brand: DEFENDO AIR/WATER/SUCTION AND BIOPSY VALVE

## (undated) DEVICE — YANKAUER,BULB TIP WITH VENT: Brand: ARGYLE

## (undated) DEVICE — PAD MINOR UNIVERSAL: Brand: MEDLINE INDUSTRIES, INC.

## (undated) DEVICE — STERILE ULTRASOUND GEL, SAFEWRAP: Brand: ECOVUE